# Patient Record
Sex: MALE | Race: WHITE | NOT HISPANIC OR LATINO | Employment: OTHER | URBAN - NONMETROPOLITAN AREA
[De-identification: names, ages, dates, MRNs, and addresses within clinical notes are randomized per-mention and may not be internally consistent; named-entity substitution may affect disease eponyms.]

---

## 2023-03-23 DIAGNOSIS — F17.200 CURRENT SMOKER: ICD-10-CM

## 2023-03-23 RX ORDER — ATENOLOL 25 MG/1
25 TABLET ORAL DAILY
COMMUNITY
End: 2023-10-23 | Stop reason: SDUPTHER

## 2023-03-23 RX ORDER — EZETIMIBE 10 MG/1
10 TABLET ORAL DAILY
COMMUNITY
End: 2023-10-23 | Stop reason: SDUPTHER

## 2023-03-23 RX ORDER — ATORVASTATIN CALCIUM 40 MG/1
40 TABLET, FILM COATED ORAL DAILY
COMMUNITY
End: 2023-10-23 | Stop reason: SDUPTHER

## 2023-03-23 RX ORDER — GABAPENTIN 600 MG/1
600 TABLET ORAL NIGHTLY
COMMUNITY
Start: 2022-07-18 | End: 2023-06-13 | Stop reason: SDUPTHER

## 2023-03-23 RX ORDER — ALBUTEROL SULFATE 90 UG/1
2 AEROSOL, METERED RESPIRATORY (INHALATION) EVERY 6 HOURS
Qty: 54 G | Refills: 3 | Status: SHIPPED | OUTPATIENT
Start: 2023-03-23 | End: 2023-07-20 | Stop reason: SDUPTHER

## 2023-03-23 RX ORDER — CLOPIDOGREL BISULFATE 75 MG/1
75 TABLET ORAL DAILY
COMMUNITY
End: 2023-10-23 | Stop reason: SDUPTHER

## 2023-03-23 RX ORDER — ALBUTEROL SULFATE 90 UG/1
2 AEROSOL, METERED RESPIRATORY (INHALATION) EVERY 6 HOURS
COMMUNITY
Start: 2021-06-16 | End: 2023-03-23 | Stop reason: SDUPTHER

## 2023-05-19 ENCOUNTER — HOSPITAL ENCOUNTER (OUTPATIENT)
Dept: DATA CONVERSION | Facility: HOSPITAL | Age: 78
End: 2023-05-19
Attending: PAIN MEDICINE | Admitting: PAIN MEDICINE

## 2023-05-19 DIAGNOSIS — M25.512 PAIN IN LEFT SHOULDER: ICD-10-CM

## 2023-05-19 DIAGNOSIS — M79.602 PAIN IN LEFT ARM: ICD-10-CM

## 2023-05-19 DIAGNOSIS — M48.02 SPINAL STENOSIS, CERVICAL REGION: ICD-10-CM

## 2023-05-19 DIAGNOSIS — M54.2 CERVICALGIA: ICD-10-CM

## 2023-05-19 DIAGNOSIS — I25.2 OLD MYOCARDIAL INFARCTION: ICD-10-CM

## 2023-05-19 DIAGNOSIS — Z79.02 LONG TERM (CURRENT) USE OF ANTITHROMBOTICS/ANTIPLATELETS: ICD-10-CM

## 2023-05-19 DIAGNOSIS — M50.10 CERVICAL DISC DISORDER WITH RADICULOPATHY, UNSPECIFIED CERVICAL REGION: ICD-10-CM

## 2023-05-19 DIAGNOSIS — I25.10 ATHEROSCLEROTIC HEART DISEASE OF NATIVE CORONARY ARTERY WITHOUT ANGINA PECTORIS: ICD-10-CM

## 2023-05-19 DIAGNOSIS — M25.511 PAIN IN RIGHT SHOULDER: ICD-10-CM

## 2023-05-19 DIAGNOSIS — E78.00 PURE HYPERCHOLESTEROLEMIA, UNSPECIFIED: ICD-10-CM

## 2023-05-19 DIAGNOSIS — Z79.82 LONG TERM (CURRENT) USE OF ASPIRIN: ICD-10-CM

## 2023-05-19 DIAGNOSIS — R52 PAIN, UNSPECIFIED: ICD-10-CM

## 2023-05-19 DIAGNOSIS — F17.200 NICOTINE DEPENDENCE, UNSPECIFIED, UNCOMPLICATED: ICD-10-CM

## 2023-05-19 DIAGNOSIS — M47.22 OTHER SPONDYLOSIS WITH RADICULOPATHY, CERVICAL REGION: ICD-10-CM

## 2023-05-19 DIAGNOSIS — I10 ESSENTIAL (PRIMARY) HYPERTENSION: ICD-10-CM

## 2023-05-19 DIAGNOSIS — Z95.5 PRESENCE OF CORONARY ANGIOPLASTY IMPLANT AND GRAFT: ICD-10-CM

## 2023-05-19 DIAGNOSIS — M79.601 PAIN IN RIGHT ARM: ICD-10-CM

## 2023-06-08 ENCOUNTER — TELEPHONE (OUTPATIENT)
Dept: PRIMARY CARE | Facility: CLINIC | Age: 78
End: 2023-06-08

## 2023-06-08 DIAGNOSIS — M19.90 OSTEOARTHRITIS, UNSPECIFIED OSTEOARTHRITIS TYPE, UNSPECIFIED SITE: Primary | ICD-10-CM

## 2023-06-08 NOTE — TELEPHONE ENCOUNTER
PATIENT C/O BILATERAL SHOULDER PAIN. USING ES TYLENOL, 500 MG EVERY 8 HOURS, WITHOUT RELIEF. WIFE IS APPLYING LIDOCAINE CREAM, WITH MINIMAL RELIEF. CAN HE TAKE GABAPENTIN, 600 MG TID INSTEAD OF ONLY AT BEDTIME?

## 2023-06-13 RX ORDER — GABAPENTIN 600 MG/1
600 TABLET ORAL 2 TIMES DAILY
Qty: 28 TABLET | Refills: 0 | Status: SHIPPED | OUTPATIENT
Start: 2023-06-13 | End: 2023-06-26 | Stop reason: SDUPTHER

## 2023-06-26 DIAGNOSIS — M19.90 OSTEOARTHRITIS, UNSPECIFIED OSTEOARTHRITIS TYPE, UNSPECIFIED SITE: ICD-10-CM

## 2023-06-26 RX ORDER — GABAPENTIN 600 MG/1
600 TABLET ORAL 3 TIMES DAILY
Qty: 90 TABLET | Refills: 1 | Status: SHIPPED | OUTPATIENT
Start: 2023-06-26 | End: 2023-10-09 | Stop reason: SDUPTHER

## 2023-07-13 LAB
ALANINE AMINOTRANSFERASE (SGPT) (U/L) IN SER/PLAS: 28 U/L (ref 10–52)
ALBUMIN (G/DL) IN SER/PLAS: 3.9 G/DL (ref 3.4–5)
ALKALINE PHOSPHATASE (U/L) IN SER/PLAS: 63 U/L (ref 33–136)
ANION GAP IN SER/PLAS: 10 MMOL/L (ref 10–20)
ASPARTATE AMINOTRANSFERASE (SGOT) (U/L) IN SER/PLAS: 25 U/L (ref 9–39)
BILIRUBIN TOTAL (MG/DL) IN SER/PLAS: 0.9 MG/DL (ref 0–1.2)
CALCIUM (MG/DL) IN SER/PLAS: 10.1 MG/DL (ref 8.6–10.3)
CARBON DIOXIDE, TOTAL (MMOL/L) IN SER/PLAS: 34 MMOL/L (ref 21–32)
CHLORIDE (MMOL/L) IN SER/PLAS: 101 MMOL/L (ref 98–107)
CHOLESTEROL (MG/DL) IN SER/PLAS: 138 MG/DL (ref 0–199)
CHOLESTEROL IN HDL (MG/DL) IN SER/PLAS: 44 MG/DL
CHOLESTEROL/HDL RATIO: 3.1
CREATININE (MG/DL) IN SER/PLAS: 1.26 MG/DL (ref 0.5–1.3)
ERYTHROCYTE DISTRIBUTION WIDTH (RATIO) BY AUTOMATED COUNT: 14.7 % (ref 11.5–14.5)
ERYTHROCYTE MEAN CORPUSCULAR HEMOGLOBIN CONCENTRATION (G/DL) BY AUTOMATED: 32.2 G/DL (ref 32–36)
ERYTHROCYTE MEAN CORPUSCULAR VOLUME (FL) BY AUTOMATED COUNT: 97 FL (ref 80–100)
ERYTHROCYTES (10*6/UL) IN BLOOD BY AUTOMATED COUNT: 5.03 X10E12/L (ref 4.5–5.9)
GFR MALE: 58 ML/MIN/1.73M2
GLUCOSE (MG/DL) IN SER/PLAS: 64 MG/DL (ref 74–99)
HEMATOCRIT (%) IN BLOOD BY AUTOMATED COUNT: 49 % (ref 41–52)
HEMOGLOBIN (G/DL) IN BLOOD: 15.8 G/DL (ref 13.5–17.5)
LDL: 73 MG/DL (ref 0–99)
LEUKOCYTES (10*3/UL) IN BLOOD BY AUTOMATED COUNT: 6.7 X10E9/L (ref 4.4–11.3)
PLATELETS (10*3/UL) IN BLOOD AUTOMATED COUNT: 178 X10E9/L (ref 150–450)
POTASSIUM (MMOL/L) IN SER/PLAS: 4 MMOL/L (ref 3.5–5.3)
PROTEIN TOTAL: 7.2 G/DL (ref 6.4–8.2)
SODIUM (MMOL/L) IN SER/PLAS: 141 MMOL/L (ref 136–145)
TRIGLYCERIDE (MG/DL) IN SER/PLAS: 103 MG/DL (ref 0–149)
UREA NITROGEN (MG/DL) IN SER/PLAS: 30 MG/DL (ref 6–23)
VLDL: 21 MG/DL (ref 0–40)

## 2023-07-19 PROBLEM — M19.012 ARTHROPATHY OF LEFT SHOULDER: Status: ACTIVE | Noted: 2023-07-19

## 2023-07-19 PROBLEM — G56.82: Status: ACTIVE | Noted: 2023-07-19

## 2023-07-19 PROBLEM — I10 HYPERTENSION, ESSENTIAL, BENIGN: Status: ACTIVE | Noted: 2023-07-19

## 2023-07-19 PROBLEM — G56.81: Status: ACTIVE | Noted: 2023-07-19

## 2023-07-19 PROBLEM — M54.12 CERVICAL RADICULAR PAIN: Status: ACTIVE | Noted: 2023-07-19

## 2023-07-19 PROBLEM — M19.011 ARTHROPATHY OF RIGHT SHOULDER: Status: ACTIVE | Noted: 2023-07-19

## 2023-07-19 PROBLEM — M25.519 SHOULDER PAIN: Status: ACTIVE | Noted: 2023-07-19

## 2023-07-19 PROBLEM — M25.519 ARTHRALGIA OF SHOULDER: Status: ACTIVE | Noted: 2023-07-19

## 2023-07-19 PROBLEM — F17.200 CURRENT SMOKER: Status: ACTIVE | Noted: 2023-07-19

## 2023-07-19 PROBLEM — M54.2 NECK PAIN: Status: ACTIVE | Noted: 2023-07-19

## 2023-07-19 PROBLEM — N40.1 BENIGN PROSTATIC HYPERPLASIA WITH URINARY OBSTRUCTION AND OTHER LOWER URINARY TRACT SYMPTOMS: Status: ACTIVE | Noted: 2023-07-19

## 2023-07-19 PROBLEM — N20.1 LEFT URETERAL STONE: Status: ACTIVE | Noted: 2023-07-19

## 2023-07-19 PROBLEM — M47.22 OSTEOARTHRITIS OF SPINE WITH RADICULOPATHY, CERVICAL REGION: Status: ACTIVE | Noted: 2023-07-19

## 2023-07-19 PROBLEM — M75.52 BURSITIS OF LEFT SHOULDER: Status: ACTIVE | Noted: 2023-07-19

## 2023-07-19 PROBLEM — N13.8 BENIGN PROSTATIC HYPERPLASIA WITH URINARY OBSTRUCTION AND OTHER LOWER URINARY TRACT SYMPTOMS: Status: ACTIVE | Noted: 2023-07-19

## 2023-07-19 PROBLEM — M48.02 DEGENERATIVE CERVICAL SPINAL STENOSIS: Status: ACTIVE | Noted: 2023-07-19

## 2023-07-19 PROBLEM — M75.51 BURSITIS OF RIGHT SHOULDER: Status: ACTIVE | Noted: 2023-07-19

## 2023-07-19 PROBLEM — S12.690A: Status: ACTIVE | Noted: 2023-07-19

## 2023-07-19 PROBLEM — N18.31 STAGE 3A CHRONIC KIDNEY DISEASE (MULTI): Status: ACTIVE | Noted: 2023-07-19

## 2023-07-19 PROBLEM — N20.0 LEFT RENAL STONE: Status: ACTIVE | Noted: 2023-07-19

## 2023-07-19 PROBLEM — S22.070A COMPRESSION FRACTURE OF T10 VERTEBRA (MULTI): Status: ACTIVE | Noted: 2023-07-19

## 2023-07-19 PROBLEM — R31.9 HEMATURIA: Status: ACTIVE | Noted: 2023-07-19

## 2023-07-19 PROBLEM — M50.30 DEGENERATION OF CERVICAL INTERVERTEBRAL DISC: Status: ACTIVE | Noted: 2023-07-19

## 2023-07-19 PROBLEM — E78.00 HYPERCHOLESTEROLEMIA: Status: ACTIVE | Noted: 2023-07-19

## 2023-07-19 PROBLEM — I25.10 CAD (CORONARY ARTERY DISEASE): Status: ACTIVE | Noted: 2023-07-19

## 2023-07-19 RX ORDER — PREDNISONE 10 MG/1
10 TABLET ORAL DAILY
COMMUNITY
Start: 2023-06-12 | End: 2023-07-20 | Stop reason: ALTCHOICE

## 2023-07-20 ENCOUNTER — OFFICE VISIT (OUTPATIENT)
Dept: PRIMARY CARE | Facility: CLINIC | Age: 78
End: 2023-07-20
Payer: MEDICARE

## 2023-07-20 VITALS
WEIGHT: 164 LBS | HEIGHT: 72 IN | HEART RATE: 64 BPM | OXYGEN SATURATION: 92 % | DIASTOLIC BLOOD PRESSURE: 50 MMHG | SYSTOLIC BLOOD PRESSURE: 90 MMHG | BODY MASS INDEX: 22.21 KG/M2

## 2023-07-20 DIAGNOSIS — M19.011 ARTHROPATHY OF RIGHT SHOULDER: ICD-10-CM

## 2023-07-20 DIAGNOSIS — M19.012 ARTHROPATHY OF LEFT SHOULDER: ICD-10-CM

## 2023-07-20 DIAGNOSIS — I10 HYPERTENSION, ESSENTIAL, BENIGN: ICD-10-CM

## 2023-07-20 DIAGNOSIS — I25.10 CORONARY ARTERY DISEASE INVOLVING NATIVE HEART WITHOUT ANGINA PECTORIS, UNSPECIFIED VESSEL OR LESION TYPE: ICD-10-CM

## 2023-07-20 DIAGNOSIS — F17.200 CURRENT SMOKER: ICD-10-CM

## 2023-07-20 DIAGNOSIS — Z00.00 ROUTINE GENERAL MEDICAL EXAMINATION AT HEALTH CARE FACILITY: Primary | ICD-10-CM

## 2023-07-20 DIAGNOSIS — E78.00 HYPERCHOLESTEROLEMIA: ICD-10-CM

## 2023-07-20 DIAGNOSIS — J43.9 PULMONARY EMPHYSEMA, UNSPECIFIED EMPHYSEMA TYPE (MULTI): ICD-10-CM

## 2023-07-20 PROCEDURE — 1170F FXNL STATUS ASSESSED: CPT | Performed by: FAMILY MEDICINE

## 2023-07-20 PROCEDURE — 3074F SYST BP LT 130 MM HG: CPT | Performed by: FAMILY MEDICINE

## 2023-07-20 PROCEDURE — 99214 OFFICE O/P EST MOD 30 MIN: CPT | Performed by: FAMILY MEDICINE

## 2023-07-20 PROCEDURE — 1160F RVW MEDS BY RX/DR IN RCRD: CPT | Performed by: FAMILY MEDICINE

## 2023-07-20 PROCEDURE — 3078F DIAST BP <80 MM HG: CPT | Performed by: FAMILY MEDICINE

## 2023-07-20 PROCEDURE — 1159F MED LIST DOCD IN RCRD: CPT | Performed by: FAMILY MEDICINE

## 2023-07-20 PROCEDURE — G0439 PPPS, SUBSEQ VISIT: HCPCS | Performed by: FAMILY MEDICINE

## 2023-07-20 RX ORDER — ALBUTEROL SULFATE 90 UG/1
2 AEROSOL, METERED RESPIRATORY (INHALATION) EVERY 6 HOURS PRN
Qty: 54 G | Refills: 3 | Status: SHIPPED | OUTPATIENT
Start: 2023-07-20 | End: 2024-07-19

## 2023-07-20 ASSESSMENT — PATIENT HEALTH QUESTIONNAIRE - PHQ9
SUM OF ALL RESPONSES TO PHQ9 QUESTIONS 1 AND 2: 0
10. IF YOU CHECKED OFF ANY PROBLEMS, HOW DIFFICULT HAVE THESE PROBLEMS MADE IT FOR YOU TO DO YOUR WORK, TAKE CARE OF THINGS AT HOME, OR GET ALONG WITH OTHER PEOPLE: NOT DIFFICULT AT ALL
1. LITTLE INTEREST OR PLEASURE IN DOING THINGS: NOT AT ALL
2. FEELING DOWN, DEPRESSED OR HOPELESS: NOT AT ALL

## 2023-07-20 ASSESSMENT — ACTIVITIES OF DAILY LIVING (ADL)
DRESSING: INDEPENDENT
TAKING_MEDICATION: INDEPENDENT
MANAGING_FINANCES: INDEPENDENT
DOING_HOUSEWORK: INDEPENDENT
BATHING: INDEPENDENT
GROCERY_SHOPPING: INDEPENDENT

## 2023-07-20 ASSESSMENT — ENCOUNTER SYMPTOMS
DEPRESSION: 0
OCCASIONAL FEELINGS OF UNSTEADINESS: 0
LOSS OF SENSATION IN FEET: 0

## 2023-07-20 NOTE — PROGRESS NOTES
Subjective   Reason for Visit: Eusebio Marroquin is an 78 y.o. male here for a Medicare Wellness visit.     Past Medical, Surgical, and Family History reviewed and updated in chart.    Reviewed all medications by prescribing practitioner or clinical pharmacist (such as prescriptions, OTCs, herbal therapies and supplements) and documented in the medical record.    HPI    Patient Care Team:  Kamari Thayer MD as PCP - General  Kamari Thayer MD as PCP - [employer] PCP     Review of Systems    Objective   Vitals:  BP 90/50   Pulse 64   Ht 1.829 m (6')   Wt 74.4 kg (164 lb)   SpO2 92%   BMI 22.24 kg/m²       Physical Exam    Assessment/Plan   Problem List Items Addressed This Visit     Current smoker   Other Visit Diagnoses     Routine general medical examination at health care facility    -  Primary

## 2023-07-20 NOTE — PROGRESS NOTES
Subjective   Patient ID: Eusebio Marroquin is a 78 y.o. male who presents for Medicare Annual Wellness Visit Subsequent (6 MO labs).    HPI   Has reduced to 3 cigs a day as noted dyspneawoth activity  To see roseline for bilat shoulder evaluation  CAD/MI in 1991and no angina  Hyperlipidemia- is on a statin and a prudent diet.  Shoulder arthritis- mri and xrays reviewed showing severe glenohumeral disease and a variety of rotator cuff disease  COPD is no PFTs he is not on a controller simply as needed albuterol.  RBA reviewed  Review of Systems  Denies chest pains palpitations.  He does note a change in his exercise tolerance and capacity with respiratory limitation.  This is what is helped him to reduce cigarettes  Chest x-ray shows emphysematous changes no PFT on file.  GI no abdominal pain reflux  Objective   BP 90/50   Pulse 64   Ht 1.829 m (6')   Wt 74.4 kg (164 lb)   SpO2 92%   BMI 22.24 kg/m²     Physical Exam  General:  Alert, No acute distress. Appears stated age  Eye:  Pupils are equal, round and reactive to light, Extraocular movements are intact, Normal conjunctiva.    Neck:  Supple, Non-tender, No carotid bruit, No jugular venous distention, No lymphadenopathy, No thyromegaly.    Respiratory:  Lungs are c some wheeze and rhonchi to auscultation, Respirations are non-labored, Breath sounds are equal.    Cardiovascular:  Normal rate, Regular rhythm, No murmur.    Gastrointestinal:  Soft, Non-tender, No organomegaly. No solid or pulsatile mass  Integumentary:  Warm, Dry. No concerning lesions on exposed areas  Neurologic:  Alert, Oriented.  Gross and fine motor intact, CN 2-12 intact  Psychiatric:  Cooperative, Appropriate mood & affect.  Assessment/Plan   Problem List Items Addressed This Visit       Arthropathy of left shoulder    Relevant Orders    Follow Up In Primary Care - Established    Arthropathy of right shoulder    Relevant Orders    Follow Up In Primary Care - Established    CAD (coronary  artery disease)    Relevant Orders    Follow Up In Primary Care - Established    Current smoker    Relevant Medications    albuterol 90 mcg/actuation inhaler    Other Relevant Orders    Follow Up In Primary Care - Established    Hypercholesterolemia    Relevant Orders    CBC    Comprehensive Metabolic Panel    Lipid Panel    Follow Up In Primary Care - Established    Hypertension, essential, benign    Relevant Orders    Follow Up In Primary Care - Established     Other Visit Diagnoses       Routine general medical examination at health care facility    -  Primary    Relevant Orders    Follow Up In Primary Care - Established    Pulmonary emphysema, unspecified emphysema type (CMS/HCC)        Relevant Medications    albuterol 90 mcg/actuation inhaler

## 2023-09-07 VITALS — BODY MASS INDEX: 23.05 KG/M2 | HEIGHT: 72 IN | WEIGHT: 170.19 LBS

## 2023-10-02 NOTE — OP NOTE
PROCEDURE DETAILS    Preoperative Diagnosis:  Radiculopathy of cervical region, M54.12    Postoperative Diagnosis:  Radiculopathy of cervical region, M54.12    Surgeon: Gerald Jc  Resident/Fellow/Other Assistant: None of these were associated with this case    Procedure:  1. C7-T1 BILLY    Anesthesia: No anesthesiologist associated with this case  Estimated Blood Loss: 0  Findings: NA  Additional Details: The patient has a greater than 2-month history of severe neck and arm pain.  The patient has previously had 6 weeks of conservative management with exercise therapy and  medications.  The patient has been compliant with a home exercise program for this issue.  The pain significantly interrupts the patient's physical function.  The patient does not desire spine surgery.  The patient had undergone a previous epidural injection  and obtained greater than 60% pain relief and functional improvement for at least 3 months.  His imaging is notable for spinal stenosis at C3-C4.  He has difficulty performing ADLs such as feeding and grooming himself due to the pain.        Operative Report:   Procedure: Interlaminar cervical epidural steroid injection under fluoroscopic guidance at the C7-T1 interspace  Diagnosis: Cervical radiculopathy  Solution: 1 mL of Kenalog 40 mg, 0.5 mL of lidocaine 2%, 2.5 mL normal saline, 4 mL total volume  Total contrast: 1 mL Omnipaque  Anesthesia: Local  Complications: None    After informed consent was obtained, the patient was brought to the OR and placed in the prone position. The area in question was prepped and draped  in sterile fashion. An AP fluoroscopic view of the cervical spine was obtained and after 3 mL of lidocaine 1% was injected into the skin, a 17-gauge Touhy needle was inserted into the skin and advanced toward the posterior lamina of the C7 vertebrae under  intermittent fluoroscopic guidance. The needle was then walked off superiorly into the C7-T1 interspace. The  epidural space was identified via loss of resistance to air. Proper needle position was confirmed by AP and contralateral oblique fluoroscopic  views. Contrast was administered under live fluoroscopy in both views and demonstrated appropriate epidural uptake and the absence of any intravascular or intrathecal spread. The local anesthetic steroid solution was then injected incrementally. The needle  was removed. Bleeding was minimal. The patient tolerated the procedure well and was transferred to the recovery room in good condition.                        Attestation:   Note Completion:  Attending Attestation I performed the procedure without a resident         Electronic Signatures:  Gerald Jc)  (Signed 19-May-2023 20:53)   Authored: Post-Operative Note, Chart Review, Note Completion      Last Updated: 19-May-2023 20:53 by Gerald Jc)

## 2023-10-09 DIAGNOSIS — M19.90 OSTEOARTHRITIS, UNSPECIFIED OSTEOARTHRITIS TYPE, UNSPECIFIED SITE: ICD-10-CM

## 2023-10-09 RX ORDER — GABAPENTIN 600 MG/1
600 TABLET ORAL 3 TIMES DAILY
Qty: 90 TABLET | Refills: 1 | Status: SHIPPED | OUTPATIENT
Start: 2023-10-09 | End: 2023-11-29 | Stop reason: SDUPTHER

## 2023-10-23 DIAGNOSIS — I25.10 CORONARY ARTERY DISEASE INVOLVING NATIVE HEART WITHOUT ANGINA PECTORIS, UNSPECIFIED VESSEL OR LESION TYPE: Primary | ICD-10-CM

## 2023-10-23 RX ORDER — EZETIMIBE 10 MG/1
10 TABLET ORAL DAILY
Qty: 90 TABLET | Refills: 3 | Status: SHIPPED | OUTPATIENT
Start: 2023-10-23 | End: 2024-10-22

## 2023-10-23 RX ORDER — ATENOLOL 25 MG/1
25 TABLET ORAL DAILY
Qty: 90 TABLET | Refills: 3 | Status: SHIPPED | OUTPATIENT
Start: 2023-10-23 | End: 2024-10-22

## 2023-10-23 RX ORDER — CLOPIDOGREL BISULFATE 75 MG/1
75 TABLET ORAL DAILY
Qty: 90 TABLET | Refills: 3 | Status: SHIPPED | OUTPATIENT
Start: 2023-10-23 | End: 2024-10-22

## 2023-10-23 RX ORDER — ATORVASTATIN CALCIUM 40 MG/1
40 TABLET, FILM COATED ORAL DAILY
Qty: 90 TABLET | Refills: 3 | Status: SHIPPED | OUTPATIENT
Start: 2023-10-23 | End: 2024-10-22

## 2023-11-29 DIAGNOSIS — M19.90 OSTEOARTHRITIS, UNSPECIFIED OSTEOARTHRITIS TYPE, UNSPECIFIED SITE: ICD-10-CM

## 2023-11-29 RX ORDER — GABAPENTIN 600 MG/1
600 TABLET ORAL 3 TIMES DAILY
Qty: 90 TABLET | Refills: 1 | Status: SHIPPED | OUTPATIENT
Start: 2023-11-29 | End: 2024-04-22 | Stop reason: SDUPTHER

## 2023-12-20 ENCOUNTER — PATIENT OUTREACH (OUTPATIENT)
Dept: CARE COORDINATION | Facility: CLINIC | Age: 78
End: 2023-12-20
Payer: MEDICARE

## 2023-12-20 DIAGNOSIS — R55 SYNCOPE AND COLLAPSE: ICD-10-CM

## 2023-12-20 DIAGNOSIS — U07.1 COVID-19: ICD-10-CM

## 2023-12-20 RX ORDER — AMOXICILLIN AND CLAVULANATE POTASSIUM 500; 125 MG/1; MG/1
1 TABLET, FILM COATED ORAL 2 TIMES DAILY
COMMUNITY
Start: 2023-12-19 | End: 2023-12-24

## 2023-12-20 NOTE — PROGRESS NOTES
Discharge Facility:[facility]  Discharge Diagnosis:R55 Syncope and collapse, U07.1 Covid-19  Admission Date:12/15/23  Discharge Date:12/19/23    PCP Appointment Date:12/27/23  Specialist Appointment Date:N/A  Hospital Encounter and Summary: Linked   See discharge assessment below for further details    Medications  Medications reviewed with patient/caregiver?: Yes (12/20/2023 10:26 AM)  Is the patient having any side effects they believe may be caused by any medication additions or changes?: No (12/20/2023 10:26 AM)  Does the patient have all medications ordered at discharge?: Yes (12/20/2023 10:26 AM)  Care Management Interventions: Provided patient education (12/20/2023 10:26 AM)  Is the patient taking all medications as directed (includes completed medication regime)?: Yes (12/20/2023 10:26 AM)  Care Management Interventions: Provided patient education (12/20/2023 10:26 AM)  Medication Comments: Augmentin (12/20/2023 10:26 AM)    Appointments  Does the patient have a primary care provider?: Yes (12/20/2023 10:26 AM)  Care Management Interventions: Verified appointment date/time/provider (12/20/2023 10:26 AM)  Has the patient kept scheduled appointments due by today?: Yes (12/20/2023 10:26 AM)  Care Management Interventions: Advised patient to keep appointment; Educated on importance of keeping appointment (12/20/2023 10:26 AM)    Self Management  Has home health visited the patient within 72 hours of discharge?: Not applicable (12/20/2023 10:26 AM)    Patient Teaching  Does the patient have access to their discharge instructions?: Yes (12/20/2023 10:26 AM)  Care Management Interventions: Reviewed instructions with patient (12/20/2023 10:26 AM)  What is the patient's perception of their health status since discharge?: Improving (12/20/2023 10:26 AM)  Is the patient/caregiver able to teach back the hierarchy of who to call/visit for symptoms/problems? PCP, Specialist, Home Health nurse, Urgent Care, ED,  911: Yes (12/20/2023 10:26 AM)

## 2023-12-27 ENCOUNTER — OFFICE VISIT (OUTPATIENT)
Dept: PRIMARY CARE | Facility: CLINIC | Age: 78
End: 2023-12-27
Payer: MEDICARE

## 2023-12-27 VITALS
HEIGHT: 72 IN | OXYGEN SATURATION: 92 % | SYSTOLIC BLOOD PRESSURE: 102 MMHG | WEIGHT: 163.9 LBS | BODY MASS INDEX: 22.2 KG/M2 | HEART RATE: 64 BPM | DIASTOLIC BLOOD PRESSURE: 54 MMHG

## 2023-12-27 DIAGNOSIS — U07.1 COVID-19: ICD-10-CM

## 2023-12-27 DIAGNOSIS — J43.9 PULMONARY EMPHYSEMA, UNSPECIFIED EMPHYSEMA TYPE (MULTI): Primary | ICD-10-CM

## 2023-12-27 PROCEDURE — 1160F RVW MEDS BY RX/DR IN RCRD: CPT | Performed by: STUDENT IN AN ORGANIZED HEALTH CARE EDUCATION/TRAINING PROGRAM

## 2023-12-27 PROCEDURE — 1159F MED LIST DOCD IN RCRD: CPT | Performed by: STUDENT IN AN ORGANIZED HEALTH CARE EDUCATION/TRAINING PROGRAM

## 2023-12-27 PROCEDURE — 3078F DIAST BP <80 MM HG: CPT | Performed by: STUDENT IN AN ORGANIZED HEALTH CARE EDUCATION/TRAINING PROGRAM

## 2023-12-27 PROCEDURE — 3074F SYST BP LT 130 MM HG: CPT | Performed by: STUDENT IN AN ORGANIZED HEALTH CARE EDUCATION/TRAINING PROGRAM

## 2023-12-27 PROCEDURE — 99495 TRANSJ CARE MGMT MOD F2F 14D: CPT | Performed by: STUDENT IN AN ORGANIZED HEALTH CARE EDUCATION/TRAINING PROGRAM

## 2023-12-27 RX ORDER — CHOLECALCIFEROL (VITAMIN D3) 25 MCG
1000 TABLET ORAL
COMMUNITY

## 2023-12-27 RX ORDER — PHENYLEPHRINE HCL 10 MG
500 TABLET ORAL
COMMUNITY

## 2023-12-27 RX ORDER — PNV NO.95/FERROUS FUM/FOLIC AC 28MG-0.8MG
1000 TABLET ORAL
COMMUNITY

## 2023-12-27 RX ORDER — GLUCOSAMINE/CHONDRO SU A 500-400 MG
1 TABLET ORAL 3 TIMES DAILY
COMMUNITY

## 2023-12-27 NOTE — PROGRESS NOTES
Subjective:  Eusebio Marroquin is a 78 y.o. male who presents to clinic today for Hospital Follow-up (12/15  Covid and pnuemonia )      Hospitalized at [facility] 12/15/23 with COVID 19 and pneumonia  - fell in the driveway 3 days before that   - he was admitted for 4 days  - he completed his augmentin after hospitalization     He went back to the ER on 12/20/23, diagnosed with dehydration and was given IV fluids    He is having difficulty with sleep     Having significant right shoulder pain  - had surgery on it September 5th    Review of Systems    Assessment/Plan:  uEsebio Marroquin is a 78 y.o. male with a history of COPD, HLT, HTN, CAD  who presents to clinic today to address the following issues:   1. Pulmonary emphysema, unspecified emphysema type (CMS/HCC)  mometasone-formoterol (Dulera 50) 50-5 mcg/actuation HFA aerosol inhaler inhaler      2. COVID-19          - Chronic problem, unresolved, new to this provider, requires further workup and treatment   - Discussed with pt that his COPD is not currently being managed with a controller inhaler, he is in agreement to start an inhaler although he may have some at home. He will check what he has at home and plan to bring all of the inhalers that he has at home to his follow up appointment    Recent Hospitalization:  -patient was able to complete medications as prescribed  - he is feeling better an feels that he is managing well at home    Problem List Items Addressed This Visit    None  Visit Diagnoses       Pulmonary emphysema, unspecified emphysema type (CMS/HCC)    -  Primary    Relevant Medications    mometasone-formoterol (Dulera 50) 50-5 mcg/actuation HFA aerosol inhaler inhaler    COVID-19                There are no Patient Instructions on file for this visit.    Follow up: with primary care doctor January 12,2023    Return precautions discussed.  An After Visit Summary was given to the patient.  All questions were answered and patient  in agreement with plan.    Objective:  /54   Pulse 64   Ht 1.829 m (6')   Wt 74.3 kg (163 lb 14.4 oz)   SpO2 92%   BMI 22.23 kg/m²     Physical Exam  Vitals and nursing note reviewed.   Constitutional:       General: He is not in acute distress.     Appearance: He is not ill-appearing.   HENT:      Head: Normocephalic and atraumatic.      Mouth/Throat:      Mouth: Mucous membranes are moist.   Eyes:      General: No scleral icterus.        Right eye: No discharge.         Left eye: No discharge.      Extraocular Movements: Extraocular movements intact.      Conjunctiva/sclera: Conjunctivae normal.   Cardiovascular:      Rate and Rhythm: Normal rate and regular rhythm.   Pulmonary:      Effort: No respiratory distress.      Breath sounds: Wheezing present.   Skin:     General: Skin is dry.   Neurological:      General: No focal deficit present.      Mental Status: He is alert and oriented to person, place, and time.   Psychiatric:         Thought Content: Thought content normal.         Judgment: Judgment normal.         I spent 22 minutes in total time for this visit including all related clinical activities before, during, and after the visit excluding other billable activities/procedure time.     Kylee Trevizo MD

## 2023-12-29 ENCOUNTER — PATIENT OUTREACH (OUTPATIENT)
Dept: CARE COORDINATION | Facility: CLINIC | Age: 78
End: 2023-12-29
Payer: MEDICARE

## 2023-12-29 NOTE — PROGRESS NOTES
Call regarding appt. with PCP on 12/27/23 after hospitalization.  At time of outreach call the patient feels as if their condition has improved since last visit.  Reviewed the PCP appointment with the pt and addressed any questions or concerns.

## 2024-01-12 ENCOUNTER — OFFICE VISIT (OUTPATIENT)
Dept: PRIMARY CARE | Facility: CLINIC | Age: 79
End: 2024-01-12
Payer: MEDICARE

## 2024-01-12 VITALS
SYSTOLIC BLOOD PRESSURE: 102 MMHG | HEIGHT: 72 IN | WEIGHT: 164.7 LBS | BODY MASS INDEX: 22.31 KG/M2 | DIASTOLIC BLOOD PRESSURE: 66 MMHG | HEART RATE: 68 BPM | OXYGEN SATURATION: 92 %

## 2024-01-12 DIAGNOSIS — J43.9 PULMONARY EMPHYSEMA, UNSPECIFIED EMPHYSEMA TYPE (MULTI): Primary | ICD-10-CM

## 2024-01-12 DIAGNOSIS — N18.31 STAGE 3A CHRONIC KIDNEY DISEASE (MULTI): ICD-10-CM

## 2024-01-12 DIAGNOSIS — I73.9 PERIPHERAL VASCULAR DISEASE, UNSPECIFIED (CMS-HCC): ICD-10-CM

## 2024-01-12 PROCEDURE — 99214 OFFICE O/P EST MOD 30 MIN: CPT | Performed by: FAMILY MEDICINE

## 2024-01-12 PROCEDURE — 1159F MED LIST DOCD IN RCRD: CPT | Performed by: FAMILY MEDICINE

## 2024-01-12 PROCEDURE — 3074F SYST BP LT 130 MM HG: CPT | Performed by: FAMILY MEDICINE

## 2024-01-12 PROCEDURE — 3078F DIAST BP <80 MM HG: CPT | Performed by: FAMILY MEDICINE

## 2024-02-01 ENCOUNTER — PATIENT OUTREACH (OUTPATIENT)
Dept: CARE COORDINATION | Facility: CLINIC | Age: 79
End: 2024-02-01
Payer: MEDICARE

## 2024-03-01 ENCOUNTER — PATIENT OUTREACH (OUTPATIENT)
Dept: CARE COORDINATION | Facility: CLINIC | Age: 79
End: 2024-03-01
Payer: MEDICARE

## 2024-03-01 NOTE — PROGRESS NOTES
Call placed regarding 90 day post discharge follow up call.  At time of outreach call the patient feels as if their condition has improved since initial visit with PCP or specialist. No questions or concerns.

## 2024-04-22 DIAGNOSIS — M19.90 OSTEOARTHRITIS, UNSPECIFIED OSTEOARTHRITIS TYPE, UNSPECIFIED SITE: ICD-10-CM

## 2024-04-22 RX ORDER — GABAPENTIN 600 MG/1
600 TABLET ORAL 3 TIMES DAILY
Qty: 90 TABLET | Refills: 1 | Status: SHIPPED | OUTPATIENT
Start: 2024-04-22 | End: 2024-06-21

## 2024-04-26 ENCOUNTER — OFFICE VISIT (OUTPATIENT)
Dept: PRIMARY CARE | Facility: CLINIC | Age: 79
End: 2024-04-26
Payer: MEDICARE

## 2024-04-26 VITALS
OXYGEN SATURATION: 97 % | HEIGHT: 72 IN | HEART RATE: 67 BPM | WEIGHT: 177.6 LBS | DIASTOLIC BLOOD PRESSURE: 72 MMHG | SYSTOLIC BLOOD PRESSURE: 122 MMHG | BODY MASS INDEX: 24.06 KG/M2

## 2024-04-26 DIAGNOSIS — S20.211A CONTUSION OF RIGHT CHEST WALL, INITIAL ENCOUNTER: Primary | ICD-10-CM

## 2024-04-26 PROCEDURE — 99213 OFFICE O/P EST LOW 20 MIN: CPT | Performed by: FAMILY MEDICINE

## 2024-04-26 PROCEDURE — 3074F SYST BP LT 130 MM HG: CPT | Performed by: FAMILY MEDICINE

## 2024-04-26 PROCEDURE — 1160F RVW MEDS BY RX/DR IN RCRD: CPT | Performed by: FAMILY MEDICINE

## 2024-04-26 PROCEDURE — 1159F MED LIST DOCD IN RCRD: CPT | Performed by: FAMILY MEDICINE

## 2024-04-26 PROCEDURE — 3078F DIAST BP <80 MM HG: CPT | Performed by: FAMILY MEDICINE

## 2024-04-26 NOTE — PROGRESS NOTES
Subjective   Patient ID: Esuebio Marroquin is a 78 y.o. male who presents for ER FU FALL.    HPI fell on steps  Had right tsr, in lst yr  Declines PT for HEP to aid in balance  Smoking cessation advised currently at half pack per day  Review of Systems  General-no fatigue weight to within 10 pounds  ENT no problems with vision swallowing  Cardiac no chest pains palpitations change in exercise tolerance or capacity  Pulmonary no cough shortness of breath  GI no heartburn or abdominal pain  Musculoskeletal no joint pains  Objective   /72   Pulse 67   Ht 1.829 m (6')   Wt 80.6 kg (177 lb 9.6 oz)   SpO2 97%   BMI 24.09 kg/m²     Physical Exam  General:  Alert, No acute distress. Appears stated age  Eye:  Pupils are equal, round and reactive to light, Extraocular movements are intact, Normal conjunctiva.    Neck:  Supple, Non-tender, No carotid bruit, No jugular venous distention, No lymphadenopathy, No thyromegaly.    Respiratory:  Lungs are clear to auscultation, Respirations are non-labored, Breath sounds are equal.  Tender on right mid thorax in anterior clavicular line  Cardiovascular:  Normal rate, Regular rhythm, No murmur.    Gastrointestinal:  Soft, Non-tender, No organomegaly. No solid or pulsatile mass  Integumentary:  Warm, Dry. No concerning lesions on exposed areas  Neurologic:  Alert, Oriented.  Gross and fine motor intact, CN 2-12 intact  Psychiatric:  Cooperative, Appropriate mood & affect.  Assessment/Plan   Problem List Items Addressed This Visit    None  Visit Diagnoses         Codes    Contusion of right chest wall, initial encounter    -  Primary S20.033Q

## 2024-07-22 ENCOUNTER — APPOINTMENT (OUTPATIENT)
Dept: PRIMARY CARE | Facility: CLINIC | Age: 79
End: 2024-07-22
Payer: MEDICARE

## 2024-07-22 ENCOUNTER — LAB (OUTPATIENT)
Dept: LAB | Facility: LAB | Age: 79
End: 2024-07-22
Payer: MEDICARE

## 2024-07-22 VITALS
SYSTOLIC BLOOD PRESSURE: 100 MMHG | HEIGHT: 72 IN | OXYGEN SATURATION: 88 % | HEART RATE: 65 BPM | DIASTOLIC BLOOD PRESSURE: 60 MMHG | BODY MASS INDEX: 23.8 KG/M2 | WEIGHT: 175.7 LBS

## 2024-07-22 DIAGNOSIS — I25.10 CORONARY ARTERY DISEASE INVOLVING NATIVE HEART WITHOUT ANGINA PECTORIS, UNSPECIFIED VESSEL OR LESION TYPE: ICD-10-CM

## 2024-07-22 DIAGNOSIS — Z00.00 ROUTINE GENERAL MEDICAL EXAMINATION AT HEALTH CARE FACILITY: Primary | ICD-10-CM

## 2024-07-22 DIAGNOSIS — I10 HYPERTENSION, ESSENTIAL, BENIGN: ICD-10-CM

## 2024-07-22 DIAGNOSIS — E78.00 HYPERCHOLESTEROLEMIA: ICD-10-CM

## 2024-07-22 DIAGNOSIS — M19.012 ARTHROPATHY OF LEFT SHOULDER: ICD-10-CM

## 2024-07-22 DIAGNOSIS — M19.011 ARTHROPATHY OF RIGHT SHOULDER: ICD-10-CM

## 2024-07-22 DIAGNOSIS — F17.200 CURRENT SMOKER: ICD-10-CM

## 2024-07-22 LAB
ALBUMIN SERPL BCP-MCNC: 4.2 G/DL (ref 3.4–5)
ALP SERPL-CCNC: 66 U/L (ref 33–136)
ALT SERPL W P-5'-P-CCNC: 20 U/L (ref 10–52)
ANION GAP SERPL CALC-SCNC: 10 MMOL/L (ref 10–20)
AST SERPL W P-5'-P-CCNC: 25 U/L (ref 9–39)
BILIRUB SERPL-MCNC: 1 MG/DL (ref 0–1.2)
BUN SERPL-MCNC: 21 MG/DL (ref 6–23)
CALCIUM SERPL-MCNC: 9.6 MG/DL (ref 8.6–10.3)
CHLORIDE SERPL-SCNC: 104 MMOL/L (ref 98–107)
CHOLEST SERPL-MCNC: 150 MG/DL (ref 0–199)
CHOLESTEROL/HDL RATIO: 3.4
CO2 SERPL-SCNC: 32 MMOL/L (ref 21–32)
CREAT SERPL-MCNC: 1.17 MG/DL (ref 0.5–1.3)
EGFRCR SERPLBLD CKD-EPI 2021: 63 ML/MIN/1.73M*2
ERYTHROCYTE [DISTWIDTH] IN BLOOD BY AUTOMATED COUNT: 14.3 % (ref 11.5–14.5)
GLUCOSE SERPL-MCNC: 77 MG/DL (ref 74–99)
HCT VFR BLD AUTO: 50.7 % (ref 41–52)
HDLC SERPL-MCNC: 44 MG/DL
HGB BLD-MCNC: 15.9 G/DL (ref 13.5–17.5)
LDLC SERPL CALC-MCNC: 82 MG/DL
MCH RBC QN AUTO: 29.9 PG (ref 26–34)
MCHC RBC AUTO-ENTMCNC: 31.4 G/DL (ref 32–36)
MCV RBC AUTO: 96 FL (ref 80–100)
NON HDL CHOLESTEROL: 106 MG/DL (ref 0–149)
NRBC BLD-RTO: 0 /100 WBCS (ref 0–0)
PLATELET # BLD AUTO: 186 X10*3/UL (ref 150–450)
POTASSIUM SERPL-SCNC: 4.3 MMOL/L (ref 3.5–5.3)
PROT SERPL-MCNC: 7.2 G/DL (ref 6.4–8.2)
RBC # BLD AUTO: 5.31 X10*6/UL (ref 4.5–5.9)
SODIUM SERPL-SCNC: 142 MMOL/L (ref 136–145)
TRIGL SERPL-MCNC: 119 MG/DL (ref 0–149)
VLDL: 24 MG/DL (ref 0–40)
WBC # BLD AUTO: 8.2 X10*3/UL (ref 4.4–11.3)

## 2024-07-22 PROCEDURE — 36415 COLL VENOUS BLD VENIPUNCTURE: CPT

## 2024-07-22 PROCEDURE — 80061 LIPID PANEL: CPT

## 2024-07-22 PROCEDURE — 80053 COMPREHEN METABOLIC PANEL: CPT

## 2024-07-22 PROCEDURE — 85027 COMPLETE CBC AUTOMATED: CPT

## 2024-07-22 PROCEDURE — 3074F SYST BP LT 130 MM HG: CPT | Performed by: FAMILY MEDICINE

## 2024-07-22 PROCEDURE — 1124F ACP DISCUSS-NO DSCNMKR DOCD: CPT | Performed by: FAMILY MEDICINE

## 2024-07-22 PROCEDURE — G0439 PPPS, SUBSEQ VISIT: HCPCS | Performed by: FAMILY MEDICINE

## 2024-07-22 PROCEDURE — 3078F DIAST BP <80 MM HG: CPT | Performed by: FAMILY MEDICINE

## 2024-07-22 PROCEDURE — 1170F FXNL STATUS ASSESSED: CPT | Performed by: FAMILY MEDICINE

## 2024-07-22 PROCEDURE — 1160F RVW MEDS BY RX/DR IN RCRD: CPT | Performed by: FAMILY MEDICINE

## 2024-07-22 PROCEDURE — 1159F MED LIST DOCD IN RCRD: CPT | Performed by: FAMILY MEDICINE

## 2024-07-22 PROCEDURE — 99214 OFFICE O/P EST MOD 30 MIN: CPT | Performed by: FAMILY MEDICINE

## 2024-07-22 ASSESSMENT — ENCOUNTER SYMPTOMS
DEPRESSION: 0
OCCASIONAL FEELINGS OF UNSTEADINESS: 0
LOSS OF SENSATION IN FEET: 0

## 2024-07-22 ASSESSMENT — ACTIVITIES OF DAILY LIVING (ADL)
DOING_HOUSEWORK: INDEPENDENT
GROCERY_SHOPPING: INDEPENDENT
BATHING: INDEPENDENT
MANAGING_FINANCES: INDEPENDENT
TAKING_MEDICATION: INDEPENDENT
DRESSING: INDEPENDENT

## 2024-07-22 NOTE — PROGRESS NOTES
Subjective   Reason for Visit: Eusebio Marroquin is an 79 y.o. male here for a Medicare Wellness visit.     Past Medical, Surgical, and Family History reviewed and updated in chart.    Reviewed all medications by prescribing practitioner or clinical pharmacist (such as prescriptions, OTCs, herbal therapies and supplements) and documented in the medical record.    HPI  In ER in April for fall  Since the last office visit there have been no interval operations, hospitalizations, important illnesses or injuries.  Half ppd, discussed/declines quit  Alcohol  none 1987   No drugs  \exercise none.  CAD no angina, no ntg use  Hyperlipidemia- is on a statin and a prudent diet.  HTN-Takes and tolerates meds without side effects. No alcohol. no tobacco. no exercise. low salt.  Reviewed recommendation for 150 minutes of exercise per week including 2 days of weight training if over age 50  Bilateral shoulder pain,  has right tsr with little effort in rehab or exercise  copd- no exacerbations since last ov.  BPH nocturia x1. Stream - has urgency  Patient Care Team:  Kamari Thayer MD as PCP - General     Review of Systems  General-no fatigue weight to within 10 pounds  ENT no problems with vision swallowing  Cardiac no chest pains palpitations change in exercise tolerance or capacity  Pulmonary no cough shortness of breath  GI no heartburn or abdominal pain  Musculoskelet multiple joint pains  Objective   Vitals:  /60   Pulse 65   Ht 1.829 m (6')   Wt 79.7 kg (175 lb 11.2 oz)   SpO2 (!) 88%   BMI 23.83 kg/m²       Physical Exam  General:  Alert, No acute distress. Appears stated age  Eye:  Pupils are equal, round and reactive to light, Extraocular movements are intact, Normal conjunctiva.    Neck:  Supple, Non-tender, No carotid bruit, No jugular venous distention, No lymphadenopathy, No thyromegaly.    Respiratory:  Lungs are clear to auscultation, Respirations are non-labored, Breath sounds are equal.     Cardiovascular:  Normal rate, Regular rhythm, No murmur.    Gastrointestinal:  Soft, Non-tender, No organomegaly. No solid or pulsatile mass  Integumentary:  Warm, Dry. No concerning lesions on exposed areas  Neurologic:  Alert, Oriented.  Gross and fine motor intact, CN 2-12 intact  Psychiatric:  Cooperative, Appropriate mood & affect.  Assessment/Plan   Problem List Items Addressed This Visit             ICD-10-CM    Arthropathy of left shoulder M19.012    Relevant Orders    Follow Up In Primary Care - Established    Arthropathy of right shoulder M19.011    Relevant Orders    Follow Up In Primary Care - Established    CAD (coronary artery disease) I25.10    Relevant Orders    Follow Up In Primary Care - Established    CBC (Completed)    Comprehensive Metabolic Panel    Lipid Panel    Current smoker F17.200    Relevant Orders    Follow Up In Primary Care - Established    Hypercholesterolemia E78.00    Relevant Orders    Follow Up In Primary Care - Established    CBC (Completed)    Comprehensive Metabolic Panel    Lipid Panel    Hypertension, essential, benign I10    Relevant Orders    Follow Up In Primary Care - Established    CBC (Completed)    Comprehensive Metabolic Panel     Other Visit Diagnoses         Codes    Routine general medical examination at health care facility    -  Primary Z00.00    Relevant Orders    Follow Up In Primary Care - Established

## 2024-08-16 ENCOUNTER — OFFICE VISIT (OUTPATIENT)
Age: 79
End: 2024-08-16
Payer: MEDICARE

## 2024-08-16 VITALS
WEIGHT: 180.2 LBS | OXYGEN SATURATION: 94 % | HEIGHT: 72 IN | BODY MASS INDEX: 24.41 KG/M2 | HEART RATE: 61 BPM | SYSTOLIC BLOOD PRESSURE: 112 MMHG | DIASTOLIC BLOOD PRESSURE: 72 MMHG

## 2024-08-16 DIAGNOSIS — J43.9 PULMONARY EMPHYSEMA, UNSPECIFIED EMPHYSEMA TYPE (MULTI): Primary | ICD-10-CM

## 2024-08-16 RX ORDER — PREDNISONE 10 MG/1
TABLET ORAL
Qty: 30 TABLET | Refills: 0 | Status: SHIPPED | OUTPATIENT
Start: 2024-08-16 | End: 2024-08-28

## 2024-08-16 ASSESSMENT — ENCOUNTER SYMPTOMS
SHORTNESS OF BREATH: 0
BLOOD IN STOOL: 0
ABDOMINAL PAIN: 0
DIARRHEA: 0
COUGH: 1
CONSTIPATION: 0
WHEEZING: 1

## 2024-08-16 ASSESSMENT — PATIENT HEALTH QUESTIONNAIRE - PHQ9
1. LITTLE INTEREST OR PLEASURE IN DOING THINGS: NOT AT ALL
2. FEELING DOWN, DEPRESSED OR HOPELESS: NOT AT ALL
SUM OF ALL RESPONSES TO PHQ9 QUESTIONS 1 AND 2: 0

## 2024-08-16 NOTE — PROGRESS NOTES
Subjective   Patient ID: Eusebio Marroquin is a 79 y.o. male who presents for Hemorrhoids (Complaints of hemorrhoids; noticed about 3 weeks ago; has been using Prep H with no relief. ).    HPI   Internal hemorrhoids and colonoscopy 2015.    Does have an internal hemorrhoid that is a problem.  No bleeding but there is some discomfort.  He does take Plavix and aspirin.    Also his lungs sound like they are acting up congestion and rhonchi.    Ahead and do a prednisone taper for the lungs and the hemorrhoid.    If he still having troubles the MRI next week he will call and we will do a hydrocortisone suppository    Review of Systems   Respiratory:  Positive for cough and wheezing. Negative for shortness of breath.    Gastrointestinal:  Negative for abdominal pain, blood in stool, constipation and diarrhea.       Objective   /72 (BP Location: Left arm, Patient Position: Sitting)   Pulse 61   Ht 1.829 m (6')   Wt 81.7 kg (180 lb 3.2 oz)   SpO2 94%   BMI 24.44 kg/m²     Physical Exam  Constitutional:       Appearance: Normal appearance.   Skin:     General: Skin is warm and dry.   Neurological:      General: No focal deficit present.      Mental Status: He is alert and oriented to person, place, and time.   Psychiatric:         Mood and Affect: Mood normal.         Behavior: Behavior normal.         Judgment: Judgment normal.         Assessment/Plan   Problem List Items Addressed This Visit             ICD-10-CM    Pulmonary emphysema, unspecified emphysema type (Multi) - Primary J43.9    Relevant Medications    predniSONE (Deltasone) 10 mg tablet

## 2024-08-26 ENCOUNTER — TELEPHONE (OUTPATIENT)
Age: 79
End: 2024-08-26
Payer: MEDICARE

## 2024-08-26 DIAGNOSIS — J43.9 PULMONARY EMPHYSEMA, UNSPECIFIED EMPHYSEMA TYPE (MULTI): ICD-10-CM

## 2024-08-26 DIAGNOSIS — K64.4 EXTERNAL HEMORRHOID: Primary | ICD-10-CM

## 2024-08-26 RX ORDER — HYDROCORTISONE 25 MG/G
CREAM TOPICAL 4 TIMES DAILY PRN
Qty: 30 G | Refills: 0 | Status: SHIPPED | OUTPATIENT
Start: 2024-08-26 | End: 2025-08-26

## 2024-08-26 RX ORDER — PREDNISONE 10 MG/1
TABLET ORAL
Qty: 30 TABLET | Refills: 0 | Status: SHIPPED | OUTPATIENT
Start: 2024-08-26 | End: 2024-09-06

## 2024-08-26 NOTE — TELEPHONE ENCOUNTER
Spouse called and states he is still having problems with his hemorrhoids. He states the medication is working well and the hemorrhoid has shrunk to about half size.    Patient requesting more medication.

## 2024-08-26 NOTE — TELEPHONE ENCOUNTER
It looks as though Dr. Lange gave the patient a prescription for prednisone.  I can renew this and give him some topical cream to try to help as well.

## 2024-09-03 DIAGNOSIS — F17.200 CURRENT SMOKER: ICD-10-CM

## 2024-09-03 DIAGNOSIS — J43.9 PULMONARY EMPHYSEMA, UNSPECIFIED EMPHYSEMA TYPE (MULTI): ICD-10-CM

## 2024-09-03 RX ORDER — ALBUTEROL SULFATE 90 UG/1
2 INHALANT RESPIRATORY (INHALATION) EVERY 6 HOURS PRN
Qty: 54 G | Refills: 3 | Status: SHIPPED | OUTPATIENT
Start: 2024-09-03 | End: 2025-09-03

## 2024-09-23 ENCOUNTER — APPOINTMENT (OUTPATIENT)
Age: 79
End: 2024-09-23
Payer: MEDICARE

## 2024-09-23 VITALS
HEIGHT: 72 IN | SYSTOLIC BLOOD PRESSURE: 122 MMHG | DIASTOLIC BLOOD PRESSURE: 62 MMHG | HEART RATE: 65 BPM | OXYGEN SATURATION: 91 % | BODY MASS INDEX: 24.46 KG/M2 | WEIGHT: 180.6 LBS

## 2024-09-23 DIAGNOSIS — F32.A DEPRESSION, UNSPECIFIED DEPRESSION TYPE: Primary | ICD-10-CM

## 2024-09-23 PROCEDURE — 1160F RVW MEDS BY RX/DR IN RCRD: CPT | Performed by: FAMILY MEDICINE

## 2024-09-23 PROCEDURE — 4004F PT TOBACCO SCREEN RCVD TLK: CPT | Performed by: FAMILY MEDICINE

## 2024-09-23 PROCEDURE — 1159F MED LIST DOCD IN RCRD: CPT | Performed by: FAMILY MEDICINE

## 2024-09-23 PROCEDURE — 99214 OFFICE O/P EST MOD 30 MIN: CPT | Performed by: FAMILY MEDICINE

## 2024-09-23 PROCEDURE — 3074F SYST BP LT 130 MM HG: CPT | Performed by: FAMILY MEDICINE

## 2024-09-23 PROCEDURE — 3078F DIAST BP <80 MM HG: CPT | Performed by: FAMILY MEDICINE

## 2024-09-23 RX ORDER — SERTRALINE HYDROCHLORIDE 50 MG/1
TABLET, FILM COATED ORAL
Qty: 30 TABLET | Refills: 5 | Status: SHIPPED | OUTPATIENT
Start: 2024-09-23

## 2024-09-23 ASSESSMENT — PATIENT HEALTH QUESTIONNAIRE - PHQ9
2. FEELING DOWN, DEPRESSED OR HOPELESS: SEVERAL DAYS
SUM OF ALL RESPONSES TO PHQ9 QUESTIONS 1 AND 2: 2
1. LITTLE INTEREST OR PLEASURE IN DOING THINGS: SEVERAL DAYS

## 2024-09-23 NOTE — PROGRESS NOTES
Subjective   Patient ID: Eusebio Marroquin is a 79 y.o. male who presents for Depression.    HPI   Nothing wrong, just dont feel like a sh..  Dont do anything, mow yard.  SL-can be hours. EMA-0. Crying-0, melancholy-ok goes out fri and sat night. Appetite=, H-0,S-0 , counts things in 3s. Conc-I dont know, TR-++, I let things bother me.  Review of Systems  Denies chest pains pressure heaviness  Objective   /62   Pulse 65   Ht 1.829 m (6')   Wt 81.9 kg (180 lb 9.6 oz)   SpO2 91%   BMI 24.49 kg/m²     Physical Exam  Heart regular lungs clear abdomen soft.  Flat affect.  Assessment/Plan   Problem List Items Addressed This Visit    None  Visit Diagnoses         Codes    Depression, unspecified depression type    -  Primary F32.A    Relevant Medications    sertraline (Zoloft) 50 mg tablet    Other Relevant Orders    Follow Up In Primary Care        Recheck after trial of sertraline

## 2024-10-14 ENCOUNTER — OFFICE VISIT (OUTPATIENT)
Age: 79
End: 2024-10-14
Payer: MEDICARE

## 2024-10-14 VITALS
OXYGEN SATURATION: 91 % | DIASTOLIC BLOOD PRESSURE: 70 MMHG | HEART RATE: 58 BPM | BODY MASS INDEX: 24.38 KG/M2 | WEIGHT: 180 LBS | HEIGHT: 72 IN | SYSTOLIC BLOOD PRESSURE: 120 MMHG

## 2024-10-14 DIAGNOSIS — M19.90 OSTEOARTHRITIS, UNSPECIFIED OSTEOARTHRITIS TYPE, UNSPECIFIED SITE: ICD-10-CM

## 2024-10-14 DIAGNOSIS — M62.830 LUMBAR PARASPINAL MUSCLE SPASM: Primary | ICD-10-CM

## 2024-10-14 DIAGNOSIS — L98.9 SKIN LESION OF HAND: ICD-10-CM

## 2024-10-14 DIAGNOSIS — I25.10 CORONARY ARTERY DISEASE INVOLVING NATIVE HEART WITHOUT ANGINA PECTORIS, UNSPECIFIED VESSEL OR LESION TYPE: ICD-10-CM

## 2024-10-14 PROCEDURE — 99214 OFFICE O/P EST MOD 30 MIN: CPT | Performed by: FAMILY MEDICINE

## 2024-10-14 PROCEDURE — 3078F DIAST BP <80 MM HG: CPT | Performed by: FAMILY MEDICINE

## 2024-10-14 PROCEDURE — 4004F PT TOBACCO SCREEN RCVD TLK: CPT | Performed by: FAMILY MEDICINE

## 2024-10-14 PROCEDURE — 1159F MED LIST DOCD IN RCRD: CPT | Performed by: FAMILY MEDICINE

## 2024-10-14 PROCEDURE — 1160F RVW MEDS BY RX/DR IN RCRD: CPT | Performed by: FAMILY MEDICINE

## 2024-10-14 PROCEDURE — 3074F SYST BP LT 130 MM HG: CPT | Performed by: FAMILY MEDICINE

## 2024-10-14 RX ORDER — GABAPENTIN 600 MG/1
600 TABLET ORAL 3 TIMES DAILY
Qty: 90 TABLET | Refills: 11 | Status: SHIPPED | OUTPATIENT
Start: 2024-10-14 | End: 2025-10-14

## 2024-10-14 RX ORDER — ATORVASTATIN CALCIUM 40 MG/1
40 TABLET, FILM COATED ORAL DAILY
Qty: 90 TABLET | Refills: 3 | Status: SHIPPED | OUTPATIENT
Start: 2024-10-14 | End: 2025-10-14

## 2024-10-14 RX ORDER — TIZANIDINE HYDROCHLORIDE 4 MG/1
CAPSULE, GELATIN COATED ORAL 3 TIMES DAILY
COMMUNITY
Start: 2024-10-07 | End: 2024-10-14 | Stop reason: SDUPTHER

## 2024-10-14 RX ORDER — EZETIMIBE 10 MG/1
10 TABLET ORAL DAILY
Qty: 90 TABLET | Refills: 3 | Status: SHIPPED | OUTPATIENT
Start: 2024-10-14 | End: 2025-10-14

## 2024-10-14 RX ORDER — CLOPIDOGREL BISULFATE 75 MG/1
75 TABLET ORAL DAILY
Qty: 90 TABLET | Refills: 3 | Status: SHIPPED | OUTPATIENT
Start: 2024-10-14 | End: 2025-10-14

## 2024-10-14 RX ORDER — TIZANIDINE HYDROCHLORIDE 4 MG/1
4 CAPSULE, GELATIN COATED ORAL 3 TIMES DAILY
Qty: 21 CAPSULE | Refills: 2 | Status: SHIPPED | OUTPATIENT
Start: 2024-10-14 | End: 2024-10-21

## 2024-10-14 RX ORDER — ATENOLOL 25 MG/1
25 TABLET ORAL DAILY
Qty: 90 TABLET | Refills: 3 | Status: SHIPPED | OUTPATIENT
Start: 2024-10-14 | End: 2025-10-14

## 2024-10-14 NOTE — PROGRESS NOTES
Subjective   Patient ID: Eusebio Marroquin is a 79 y.o. male who presents for Follow-up (ER 10/7/24).    HPI   Here in emergency room follow-up.  Was not x-rayed in the ER.  Very good ER note showing that there were no red flag symptoms and certainly no neurologic involvement.  He confirms all of the above today.  He has had episodic spasms on the right lower lumbar paraspinals and it was relieved by the spasmolytics.  He is currently out we will refill if after 1 week he is not resolved completely then I would recommend x-ray.  No bowel bladder control no radicular symptoms.  Also has about a 1 cm BCC on the dorsum of the left hand  CAD no angina  Review of Systems  No fever or systemic symptoms.  Objective   /70   Pulse 58   Ht 1.829 m (6')   Wt 81.6 kg (180 lb)   SpO2 91%   BMI 24.41 kg/m²     Physical Exam  Monofilament is reported to be very lightly felt.  Ankle jerks absent bilaterally knee jerks +2 and equal bilaterally.  EHL strength is equal and full.  He is minimally tender to deep palpation on the right lumbar paraspinals, iliolumbar ligament area  Assessment/Plan   Problem List Items Addressed This Visit             ICD-10-CM    CAD (coronary artery disease) I25.10    Relevant Medications    atenolol (Tenormin) 25 mg tablet    atorvastatin (Lipitor) 40 mg tablet    clopidogrel (Plavix) 75 mg tablet    ezetimibe (Zetia) 10 mg tablet     Other Visit Diagnoses         Codes    Lumbar paraspinal muscle spasm    -  Primary M62.830    Relevant Medications    tiZANidine (Zanaflex) 4 mg capsule    Skin lesion of hand     L98.9    Relevant Orders    Referral to General Surgery    Osteoarthritis, unspecified osteoarthritis type, unspecified site     M19.90    Relevant Medications    gabapentin (Neurontin) 600 mg tablet

## 2024-10-15 ENCOUNTER — APPOINTMENT (OUTPATIENT)
Age: 79
End: 2024-10-15
Payer: MEDICARE

## 2024-10-17 ENCOUNTER — TELEPHONE (OUTPATIENT)
Age: 79
End: 2024-10-17
Payer: MEDICARE

## 2024-10-18 DIAGNOSIS — L98.9 SKIN LESION OF HAND: ICD-10-CM

## 2024-10-21 ENCOUNTER — TELEPHONE (OUTPATIENT)
Age: 79
End: 2024-10-21
Payer: COMMERCIAL

## 2024-10-21 DIAGNOSIS — M47.22 OSTEOARTHRITIS OF SPINE WITH RADICULOPATHY, CERVICAL REGION: Primary | ICD-10-CM

## 2024-10-22 ENCOUNTER — HOSPITAL ENCOUNTER (OUTPATIENT)
Dept: RADIOLOGY | Facility: HOSPITAL | Age: 79
Discharge: HOME | End: 2024-10-22
Payer: COMMERCIAL

## 2024-10-22 DIAGNOSIS — M47.22 OSTEOARTHRITIS OF SPINE WITH RADICULOPATHY, CERVICAL REGION: ICD-10-CM

## 2024-10-22 PROCEDURE — 72100 X-RAY EXAM L-S SPINE 2/3 VWS: CPT

## 2024-10-24 ENCOUNTER — APPOINTMENT (OUTPATIENT)
Dept: SURGERY | Facility: CLINIC | Age: 79
End: 2024-10-24
Payer: MEDICARE

## 2024-10-25 ENCOUNTER — TELEPHONE (OUTPATIENT)
Age: 79
End: 2024-10-25
Payer: COMMERCIAL

## 2024-10-25 DIAGNOSIS — M47.816 ARTHRITIS, LUMBAR SPINE: ICD-10-CM

## 2024-10-25 DIAGNOSIS — M62.830 LUMBAR PARASPINAL MUSCLE SPASM: ICD-10-CM

## 2024-10-25 NOTE — TELEPHONE ENCOUNTER
----- Message from Kamari Thayer sent at 10/25/2024  1:40 PM EDT -----  There is a lot of arthritis changes which could be generating pain.  If interested in better analgesia he can be referred to pain clinic    Pt notified and agreed to see PM. Referral placed.

## 2024-11-05 ENCOUNTER — OFFICE VISIT (OUTPATIENT)
Dept: PAIN MEDICINE | Facility: CLINIC | Age: 79
End: 2024-11-05
Payer: MEDICARE

## 2024-11-05 VITALS — RESPIRATION RATE: 16 BRPM | SYSTOLIC BLOOD PRESSURE: 108 MMHG | DIASTOLIC BLOOD PRESSURE: 69 MMHG | HEART RATE: 73 BPM

## 2024-11-05 DIAGNOSIS — M62.830 LUMBAR PARASPINAL MUSCLE SPASM: ICD-10-CM

## 2024-11-05 DIAGNOSIS — M47.816 ARTHRITIS, LUMBAR SPINE: ICD-10-CM

## 2024-11-05 PROCEDURE — 99214 OFFICE O/P EST MOD 30 MIN: CPT

## 2024-11-05 RX ORDER — BUPIVACAINE HYDROCHLORIDE 5 MG/ML
3 INJECTION, SOLUTION EPIDURAL; INTRACAUDAL ONCE
OUTPATIENT
Start: 2024-11-05 | End: 2024-11-05

## 2024-11-05 RX ORDER — LIDOCAINE HYDROCHLORIDE 20 MG/ML
10 INJECTION, SOLUTION EPIDURAL; INFILTRATION; INTRACAUDAL; PERINEURAL ONCE
OUTPATIENT
Start: 2024-11-05 | End: 2024-11-05

## 2024-11-05 ASSESSMENT — PATIENT HEALTH QUESTIONNAIRE - PHQ9
SUM OF ALL RESPONSES TO PHQ9 QUESTIONS 1 AND 2: 0
2. FEELING DOWN, DEPRESSED OR HOPELESS: NOT AT ALL
1. LITTLE INTEREST OR PLEASURE IN DOING THINGS: NOT AT ALL

## 2024-11-05 ASSESSMENT — ENCOUNTER SYMPTOMS
DYSPHORIC MOOD: 0
FACIAL ASYMMETRY: 0
BACK PAIN: 1
COUGH: 0
WEAKNESS: 0
ADENOPATHY: 0
EYES NEGATIVE: 1
MYALGIAS: 1
ARTHRALGIAS: 0
BRUISES/BLEEDS EASILY: 0
LIGHT-HEADEDNESS: 0
CONSTITUTIONAL NEGATIVE: 1
ENDOCRINE NEGATIVE: 1
ALLERGIC/IMMUNOLOGIC NEGATIVE: 1
PALPITATIONS: 0
SHORTNESS OF BREATH: 0
WHEEZING: 0

## 2024-11-05 ASSESSMENT — COLUMBIA-SUICIDE SEVERITY RATING SCALE - C-SSRS
2. HAVE YOU ACTUALLY HAD ANY THOUGHTS OF KILLING YOURSELF?: NO
1. IN THE PAST MONTH, HAVE YOU WISHED YOU WERE DEAD OR WISHED YOU COULD GO TO SLEEP AND NOT WAKE UP?: NO
6. HAVE YOU EVER DONE ANYTHING, STARTED TO DO ANYTHING, OR PREPARED TO DO ANYTHING TO END YOUR LIFE?: NO

## 2024-11-05 NOTE — PROGRESS NOTES
"Subjective   Patient ID: Eusebio Marroquin is a 79 y.o. male who presents for Back Pain (NPV here for evaluation of Rt side lower back pain, does not radiate, rates 2/10 now and 10/10 at worst activity does not matter, describes as \"constant hurt\" and when the pain is bad stabbing.  The pain started 4 months ago. He denies an injury but he has had multiple falls in the past year he states \"I don't remember,\" he has tried Tylenol, Ibuprofen sparingly d/t kidney disease, Gabapentin 600mg TID, tizanidine these only help his pain a little. ) MARLA score 30%, SOAPP score 6, screenings Depression negative, she smokes daily and has fallen could be related to COVID and pneumonia education  provided for both.   Aubree Lacey RN 11/05/24 1:30 PM     Patient is an 79-year-old male who presents today as a new patient referred to us by his PCP for low back pain.  The patient reports that it became noticeable approximately 4 months ago.  He denies any sort of inciting injury or event.  He currently rates his pain a 2/10, but says it can get up to 10/10 at its worst, and that the pain is pretty well constant and when it gets severe it can be a stabbing pain, worse in the right side of his lower back.  He has tried Tylenol, ibuprofen (sparingly), gabapentin, and tizanidine, none of which brought any meaningful pain relief.  He had x-rays done by his PCP, and is open to any injection options we have to offer to help with this pain.        Review of Systems   Constitutional: Negative.    HENT: Negative.     Eyes: Negative.    Respiratory:  Negative for cough, shortness of breath and wheezing.    Cardiovascular:  Negative for chest pain, palpitations and leg swelling.   Endocrine: Negative.    Genitourinary: Negative.    Musculoskeletal:  Positive for back pain and myalgias. Negative for arthralgias.   Skin: Negative.    Allergic/Immunologic: Negative.    Neurological:  Negative for facial asymmetry, weakness and light-headedness. "   Hematological:  Negative for adenopathy. Does not bruise/bleed easily.   Psychiatric/Behavioral:  Negative for dysphoric mood and suicidal ideas.        Objective   Physical Exam  Constitutional:       General: He is not in acute distress.     Appearance: Normal appearance.   HENT:      Head: Normocephalic.      Mouth/Throat:      Mouth: Mucous membranes are moist.   Eyes:      Extraocular Movements: Extraocular movements intact.   Cardiovascular:      Rate and Rhythm: Normal rate and regular rhythm.      Pulses: Normal pulses.      Heart sounds: Normal heart sounds. No murmur heard.     No friction rub. No gallop.   Pulmonary:      Effort: Pulmonary effort is normal.      Breath sounds: Normal breath sounds. No wheezing, rhonchi or rales.   Abdominal:      General: Abdomen is flat.      Palpations: Abdomen is soft.   Musculoskeletal:      Cervical back: Normal range of motion.      Right lower leg: No edema.      Left lower leg: No edema.      Comments: Ambulates without assistance  Strength 5/5 BLE  No lumbar paraspinal tenderness to palpation  Facet loading positive  Yfn finger negative bilaterally   Lymphadenopathy:      Cervical: No cervical adenopathy.   Skin:     General: Skin is warm and dry.   Neurological:      General: No focal deficit present.      Mental Status: He is alert and oriented to person, place, and time. Mental status is at baseline.   Psychiatric:         Mood and Affect: Mood normal.         Behavior: Behavior normal.         Assessment/Plan   Diagnoses and all orders for this visit:  Lumbar paraspinal muscle spasm  -     Referral to Pain Medicine  Arthritis, lumbar spine  -     Referral to Pain Medicine  -     FL pain management; Future  -      Medial Nerve Branch Block; Future  Other orders  -     iohexol (OMNIPaque) 300 mg iodine/mL solution 3 mL  -     lidocaine PF (Xylocaine) 20 mg/mL (2 %) injection 200 mg  -     bupivacaine PF 0.5 % (Marcaine) 0.5 % (5 mg/mL) injection 15 mg  -      NPO Diet Except: Sips with meds; Effective now; Standing  -     Height and weight; Standing  -     Type And Screen; Standing  -     Inpatient consult to Respiratory Care; Standing  -     Adult diet Regular; Standing  -     Vital Signs; Standing  -     Notify physician - Standard Parameters; Standing  -     Continue IV fluids ordered pre-procedure; Standing  -     Prior to Discharge O2 Weaning; Standing  -     Pulse oximetry, continuous; Standing  -     Discharge patient; Standing       The patient is an 79-year-old male with a past medical history significant for neck pain, cervical disc degeneration, cervical radiculopathy, bilateral shoulder arthritis, lumbar facet arthropathy, cervical osteoarthritis.  He currently rates his pain a 2/10, but says it can get up to 10/10 at its worst primarily across his lower back, worse on the right side.  The pain does not radiate anywhere, and is pretty constant, not necessarily exacerbated by any particular activity.  He says the only time he is able to walk pain-free as if he walks very hunched over.  We reviewed the x-rays that were ordered by his PCP and based on findings, his pain pattern, his physical exam, his failure to improve with previous conservative treatment, I recommend pursuing a bilateral medial branch block covering the L4-S1 facet joints under fluoroscopy.  The procedure was discussed, risks and benefits were discussed, patient is agreeable.  Med holds include numerous vitamins/supplements for 1 week, and Plavix for 1 week.  He will follow-up after the injection for reevaluation, call clinic sooner if needed.

## 2024-11-13 ENCOUNTER — TELEPHONE (OUTPATIENT)
Dept: PAIN MEDICINE | Facility: CLINIC | Age: 79
End: 2024-11-13
Payer: MEDICARE

## 2024-11-25 ENCOUNTER — APPOINTMENT (OUTPATIENT)
Age: 79
End: 2024-11-25
Payer: MEDICARE

## 2024-11-25 VITALS
WEIGHT: 179.2 LBS | DIASTOLIC BLOOD PRESSURE: 58 MMHG | HEART RATE: 83 BPM | SYSTOLIC BLOOD PRESSURE: 118 MMHG | HEIGHT: 72 IN | OXYGEN SATURATION: 92 % | BODY MASS INDEX: 24.27 KG/M2

## 2024-11-25 DIAGNOSIS — F32.A DEPRESSION, UNSPECIFIED DEPRESSION TYPE: ICD-10-CM

## 2024-11-25 PROCEDURE — 3074F SYST BP LT 130 MM HG: CPT | Performed by: FAMILY MEDICINE

## 2024-11-25 PROCEDURE — 99213 OFFICE O/P EST LOW 20 MIN: CPT | Performed by: FAMILY MEDICINE

## 2024-11-25 PROCEDURE — 3078F DIAST BP <80 MM HG: CPT | Performed by: FAMILY MEDICINE

## 2024-11-25 PROCEDURE — 1159F MED LIST DOCD IN RCRD: CPT | Performed by: FAMILY MEDICINE

## 2024-11-25 PROCEDURE — 1160F RVW MEDS BY RX/DR IN RCRD: CPT | Performed by: FAMILY MEDICINE

## 2024-11-25 PROCEDURE — 4004F PT TOBACCO SCREEN RCVD TLK: CPT | Performed by: FAMILY MEDICINE

## 2024-11-25 RX ORDER — SERTRALINE HYDROCHLORIDE 50 MG/1
TABLET, FILM COATED ORAL
Qty: 90 TABLET | Refills: 3 | Status: SHIPPED | OUTPATIENT
Start: 2024-11-25

## 2024-11-25 NOTE — PROGRESS NOTES
Subjective   Patient ID: Eusebio Marroquin is a 79 y.o. male who presents for Follow-up (2 mo).    HPI   Felt better on zoloft so stopped it recently (30d) agree to resstart for a 9-12m course.  We will check in 6 months.  Review of Systems    Objective   /58   Pulse 83   Ht 1.829 m (6')   Wt 81.3 kg (179 lb 3.2 oz)   SpO2 92%   BMI 24.30 kg/m²     Physical Exam  Mood remains good he feels he has better perspective outlook on living.  Assessment/Plan   Problem List Items Addressed This Visit    None  Visit Diagnoses         Codes    Depression, unspecified depression type     F32.A    Relevant Medications    sertraline (Zoloft) 50 mg tablet    Other Relevant Orders    Follow Up In Primary Care

## 2024-12-06 ENCOUNTER — HOSPITAL ENCOUNTER (OUTPATIENT)
Dept: OPERATING ROOM | Facility: HOSPITAL | Age: 79
Setting detail: OUTPATIENT SURGERY
Discharge: HOME | End: 2024-12-06
Payer: MEDICARE

## 2024-12-06 VITALS
HEART RATE: 66 BPM | OXYGEN SATURATION: 94 % | WEIGHT: 180 LBS | TEMPERATURE: 98 F | BODY MASS INDEX: 24.41 KG/M2 | DIASTOLIC BLOOD PRESSURE: 70 MMHG | SYSTOLIC BLOOD PRESSURE: 134 MMHG | RESPIRATION RATE: 16 BRPM

## 2024-12-06 DIAGNOSIS — M47.816 ARTHRITIS, LUMBAR SPINE: ICD-10-CM

## 2024-12-06 PROCEDURE — 2500000004 HC RX 250 GENERAL PHARMACY W/ HCPCS (ALT 636 FOR OP/ED): Performed by: STUDENT IN AN ORGANIZED HEALTH CARE EDUCATION/TRAINING PROGRAM

## 2024-12-06 PROCEDURE — 64494 INJ PARAVERT F JNT L/S 2 LEV: CPT | Mod: 50 | Performed by: STUDENT IN AN ORGANIZED HEALTH CARE EDUCATION/TRAINING PROGRAM

## 2024-12-06 PROCEDURE — 2550000001 HC RX 255 CONTRASTS: Performed by: STUDENT IN AN ORGANIZED HEALTH CARE EDUCATION/TRAINING PROGRAM

## 2024-12-06 RX ORDER — BUPIVACAINE HYDROCHLORIDE 5 MG/ML
INJECTION, SOLUTION EPIDURAL; INTRACAUDAL AS NEEDED
Status: COMPLETED | OUTPATIENT
Start: 2024-12-06 | End: 2024-12-06

## 2024-12-06 RX ORDER — LIDOCAINE HYDROCHLORIDE 20 MG/ML
INJECTION, SOLUTION EPIDURAL; INFILTRATION; INTRACAUDAL; PERINEURAL AS NEEDED
Status: COMPLETED | OUTPATIENT
Start: 2024-12-06 | End: 2024-12-06

## 2024-12-06 ASSESSMENT — PAIN - FUNCTIONAL ASSESSMENT: PAIN_FUNCTIONAL_ASSESSMENT: 0-10

## 2024-12-06 ASSESSMENT — COLUMBIA-SUICIDE SEVERITY RATING SCALE - C-SSRS
6. HAVE YOU EVER DONE ANYTHING, STARTED TO DO ANYTHING, OR PREPARED TO DO ANYTHING TO END YOUR LIFE?: NO
1. IN THE PAST MONTH, HAVE YOU WISHED YOU WERE DEAD OR WISHED YOU COULD GO TO SLEEP AND NOT WAKE UP?: NO
2. HAVE YOU ACTUALLY HAD ANY THOUGHTS OF KILLING YOURSELF?: NO

## 2024-12-06 ASSESSMENT — PAIN SCALES - GENERAL
PAINLEVEL_OUTOF10: 4
PAINLEVEL_OUTOF10: 0 - NO PAIN

## 2024-12-06 NOTE — PERIOPERATIVE NURSING NOTE
Patient educated on burn on back.  Assessed by Dr. Herzog as burn from heating pad.  Patient educated on use of heating pad for long periods of time.  He states he will completely stop using heating pad.  Patient verbalized understanding on burn and instructions and denied questions.

## 2024-12-06 NOTE — H&P
History Of Present Illness  Eusebio Marroquin is a 79 y.o. male presenting with pain.     Past Medical History  Past Medical History:   Diagnosis Date    Acute myocardial infarction, unspecified     AMI (acute myocardial infarction)    Calculus of kidney 2022    Left renal stone    Personal history of other diseases of male genital organs     History of erectile dysfunction    Personal history of other diseases of urinary system     History of hydronephrosis    Personal history of other specified conditions     History of nocturia    Personal history of urinary calculi     History of kidney stones       Surgical History  Past Surgical History:   Procedure Laterality Date    OTHER SURGICAL HISTORY  2019    Colonoscopy    OTHER SURGICAL HISTORY  2019    Abdominal aortic aneurysm repair    OTHER SURGICAL HISTORY  2019    Cardiac catheterization with stent placement    OTHER SURGICAL HISTORY  2022    Diagnostic nerve block    OTHER SURGICAL HISTORY  2021    Epidural steroid injection    OTHER SURGICAL HISTORY  2022    Intra-articular injection    SHOULDER ARTHROSCOPY Right 2023        Social History  He reports that he has been smoking cigarettes. He has been exposed to tobacco smoke. He has never used smokeless tobacco. He reports that he does not currently use alcohol. He reports that he does not currently use drugs.    Family History  Family History   Problem Relation Name Age of Onset    Heart attack Mother              Other (CVA) Mother      Hyperlipidemia Mother      Hypertension Mother      Other (CABG) Mother      Heart attack Father              Hyperlipidemia Father      Hypertension Father      Depression Sister          Allergies  Oxycodone-acetaminophen and Cefadroxil    Review of Systems   All other systems reviewed and are negative.       Physical Exam     Last Recorded Vitals  There were no vitals taken for this visit.    Relevant  Results        Constitutional: No acute distress, well appearing and well nourished. Patient appears stated age.  Eyes:  nonicteric sclerae   ENT: Hearing is grossly intact.  Neck: trachea midline  Head and Face: grossly normal.  Respiratory: nonlabored breathing  Cardiovascular: rate per vitals.  Neuro: alert, moving extremities.       Assessment/Plan   Assessment & Plan  Arthritis, lumbar spine      Lumbar mbb's       I spent   minutes in the professional and overall care of this patient.      Terry Herzog, DO

## 2024-12-06 NOTE — PERIOPERATIVE NURSING NOTE
Discharge instructions reviewed by Rachel GONZALEZ RN  no questions and verbalized understanding.   discharged amb steady gait, to exit  to be driven home by family hai well

## 2024-12-09 ENCOUNTER — OFFICE VISIT (OUTPATIENT)
Dept: PAIN MEDICINE | Facility: CLINIC | Age: 79
End: 2024-12-09
Payer: MEDICARE

## 2024-12-09 VITALS
DIASTOLIC BLOOD PRESSURE: 81 MMHG | SYSTOLIC BLOOD PRESSURE: 135 MMHG | WEIGHT: 179 LBS | HEART RATE: 89 BPM | RESPIRATION RATE: 18 BRPM | BODY MASS INDEX: 24.28 KG/M2

## 2024-12-09 DIAGNOSIS — M47.816 ARTHRITIS, LUMBAR SPINE: Primary | ICD-10-CM

## 2024-12-09 PROCEDURE — 99213 OFFICE O/P EST LOW 20 MIN: CPT

## 2024-12-09 ASSESSMENT — ENCOUNTER SYMPTOMS
ADENOPATHY: 0
CONSTITUTIONAL NEGATIVE: 1
BACK PAIN: 1
WEAKNESS: 0
BRUISES/BLEEDS EASILY: 0
DYSPHORIC MOOD: 0
EYES NEGATIVE: 1
MYALGIAS: 1
ENDOCRINE NEGATIVE: 1
ALLERGIC/IMMUNOLOGIC NEGATIVE: 1
FACIAL ASYMMETRY: 0
COUGH: 0
WHEEZING: 0
PALPITATIONS: 0
SHORTNESS OF BREATH: 0
LIGHT-HEADEDNESS: 0
ARTHRALGIAS: 0

## 2024-12-09 ASSESSMENT — COLUMBIA-SUICIDE SEVERITY RATING SCALE - C-SSRS
1. IN THE PAST MONTH, HAVE YOU WISHED YOU WERE DEAD OR WISHED YOU COULD GO TO SLEEP AND NOT WAKE UP?: NO
6. HAVE YOU EVER DONE ANYTHING, STARTED TO DO ANYTHING, OR PREPARED TO DO ANYTHING TO END YOUR LIFE?: NO
2. HAVE YOU ACTUALLY HAD ANY THOUGHTS OF KILLING YOURSELF?: NO

## 2024-12-09 NOTE — PROGRESS NOTES
Subjective   Patient ID: Eusebio Marroquin is a 79 y.o. male who presents for Follow-up (FUV for Bilat L4/5-L5/S1 MBB he reports 100% pain relief and still working, he was able to do some work outside that he normally has severe pain with and was just a little sore. Today reports 0 pain and has not had pain since the injection. He reports he was sore from working outside and took ibuprofen and it helped his pain, before his injection Ibuprofen did not help much. ) He did not hear his instructions to restart his medication as marked on his discharge instructions and request his wife be involved in the discharge instructions. MARLA score 4%, Screening Alcohol negative.  Aubree Lacey RN 12/09/24 11:13 AM     The patient is an 79-year-old male who presents today for follow-up after undergoing his first bilateral medial branch block covering the L4-S1 facet joints on 12/6/2024.  The patient reports significant relief from this procedure with 100% pain relief.  He is not having any pain at this time, and yesterday was able to go and work out in his yard without any pain.  He said he was only sore after this.  He says since the injection he has not had any pain at all.  Because of this, he does not want to move forward with the second step at this time.        Review of Systems   Constitutional: Negative.    HENT: Negative.     Eyes: Negative.    Respiratory:  Negative for cough, shortness of breath and wheezing.    Cardiovascular:  Negative for chest pain, palpitations and leg swelling.   Endocrine: Negative.    Genitourinary: Negative.    Musculoskeletal:  Positive for back pain and myalgias. Negative for arthralgias.   Skin: Negative.    Allergic/Immunologic: Negative.    Neurological:  Negative for facial asymmetry, weakness and light-headedness.   Hematological:  Negative for adenopathy. Does not bruise/bleed easily.   Psychiatric/Behavioral:  Negative for dysphoric mood and suicidal ideas.        Objective   Physical  Exam  Constitutional:       General: He is not in acute distress.     Appearance: Normal appearance.   HENT:      Head: Normocephalic.      Mouth/Throat:      Mouth: Mucous membranes are moist.   Eyes:      Extraocular Movements: Extraocular movements intact.   Cardiovascular:      Rate and Rhythm: Normal rate and regular rhythm.      Pulses: Normal pulses.      Heart sounds: Normal heart sounds. No murmur heard.     No friction rub. No gallop.   Pulmonary:      Effort: Pulmonary effort is normal.      Breath sounds: Normal breath sounds. No wheezing, rhonchi or rales.   Abdominal:      General: Abdomen is flat.      Palpations: Abdomen is soft.   Musculoskeletal:      Cervical back: Normal range of motion.      Right lower leg: No edema.      Left lower leg: No edema.      Comments: Ambulates without assistance  Strength 5/5 BLE  2+ pitting edema noted to bilateral lower extremities   Lymphadenopathy:      Cervical: No cervical adenopathy.   Skin:     General: Skin is warm and dry.   Neurological:      General: No focal deficit present.      Mental Status: He is alert and oriented to person, place, and time. Mental status is at baseline.   Psychiatric:         Mood and Affect: Mood normal.         Behavior: Behavior normal.         Assessment/Plan   Diagnoses and all orders for this visit:  Arthritis, lumbar spine       The patient is a 79-year-old male with a past medical history significant for the above-mentioned problem.  He is following up after lumbar MBB with significant ongoing relief.  He does not have any pain complaints at this time.  He did mention bilateral lower extremity swelling, noted to be pitting edema.  Advised the patient to follow-up with his PCP regarding this problem, patient voiced understanding and said he would call.  Otherwise from our standpoint, he can follow-up with us on an as-needed basis, call the clinic if needed.

## 2024-12-23 ENCOUNTER — TELEPHONE (OUTPATIENT)
Dept: PAIN MEDICINE | Facility: CLINIC | Age: 79
End: 2024-12-23
Payer: MEDICARE

## 2024-12-23 NOTE — TELEPHONE ENCOUNTER
----- Message from Nurse Stacey CABRERA sent at 11/5/2024  3:35 PM EST -----    ----- Message -----  From: Kamari Thayer MD  Sent: 11/5/2024   2:47 PM EST  To: Stacey Frankel RN    Yes  ----- Message -----  From: Stacey Frankel RN  Sent: 11/5/2024   2:33 PM EST  To: MD Dr. Flaca Myers;  Pt. Is scheduled for a MBBx2 + RFA series with Dr. Vamsi Herzog.  Is it acceptable to you that he hold his Plavix for 7 da7s prior to and 24 hours after his procedure?  Thank you,  Stacey

## 2025-01-23 ENCOUNTER — APPOINTMENT (OUTPATIENT)
Age: 80
End: 2025-01-23
Payer: MEDICARE

## 2025-01-23 VITALS
DIASTOLIC BLOOD PRESSURE: 58 MMHG | SYSTOLIC BLOOD PRESSURE: 120 MMHG | BODY MASS INDEX: 23.87 KG/M2 | HEART RATE: 67 BPM | OXYGEN SATURATION: 91 % | WEIGHT: 176 LBS

## 2025-01-23 DIAGNOSIS — M19.012 ARTHROPATHY OF LEFT SHOULDER: ICD-10-CM

## 2025-01-23 DIAGNOSIS — E78.00 HYPERCHOLESTEROLEMIA: ICD-10-CM

## 2025-01-23 DIAGNOSIS — F17.200 CURRENT SMOKER: ICD-10-CM

## 2025-01-23 DIAGNOSIS — I25.10 CORONARY ARTERY DISEASE INVOLVING NATIVE HEART WITHOUT ANGINA PECTORIS, UNSPECIFIED VESSEL OR LESION TYPE: ICD-10-CM

## 2025-01-23 DIAGNOSIS — I10 HYPERTENSION, ESSENTIAL, BENIGN: Primary | ICD-10-CM

## 2025-01-23 DIAGNOSIS — Z00.00 ROUTINE GENERAL MEDICAL EXAMINATION AT HEALTH CARE FACILITY: ICD-10-CM

## 2025-01-23 DIAGNOSIS — M19.011 ARTHROPATHY OF RIGHT SHOULDER: ICD-10-CM

## 2025-01-23 PROCEDURE — 1160F RVW MEDS BY RX/DR IN RCRD: CPT | Performed by: FAMILY MEDICINE

## 2025-01-23 PROCEDURE — 3078F DIAST BP <80 MM HG: CPT | Performed by: FAMILY MEDICINE

## 2025-01-23 PROCEDURE — 99214 OFFICE O/P EST MOD 30 MIN: CPT | Performed by: FAMILY MEDICINE

## 2025-01-23 PROCEDURE — 3074F SYST BP LT 130 MM HG: CPT | Performed by: FAMILY MEDICINE

## 2025-01-23 PROCEDURE — 1159F MED LIST DOCD IN RCRD: CPT | Performed by: FAMILY MEDICINE

## 2025-01-23 NOTE — PROGRESS NOTES
Subjective   Patient ID: Eusebio Marroquin is a 79 y.o. male who presents for Follow-up (6 mo).    HPI Since the last office visit there have been no interval operations, hospitalizations, important illnesses or injuries.  Has been to pain clinic, got shots in back, consider rhiz.  Got great relief after 1 shot, so no further tx. To returnif relapse.  Has monika to hs only  copd- no exacerbations since last ov.  , albabout bid  Hyperlipidemia- is on a statin and a prudent diet.  CAD- no angina  Smokes 1/2 ppd , improved, cessation advised  On sertraline for mood.  Not getting upset anymore.  Review of Systems  General-no fatigue weight to within 10 pounds  ENT no problems with vision swallowing  Cardiac no chest pains palpitations change in exercise tolerance or capacity  Pulmonary no cough shortness of breath  GI no heartburn or abdominal pain  Musculoskeletal no joint pains  Objective   /58   Pulse 67   Wt 79.8 kg (176 lb)   SpO2 91%   BMI 23.87 kg/m²     Physical Exam  General:  Alert, No acute distress. Appears stated age  Eye:  Pupils are equal, round and reactive to light, Extraocular movements are intact, Normal conjunctiva.    Neck:  Supple, Non-tender, No carotid bruit, No jugular venous distention, No lymphadenopathy, No thyromegaly.    Respiratory:  Lungs are clear to auscultation, Respirations are non-labored, Breath sounds are equal.    Cardiovascular:  Normal rate, Regular rhythm, No murmur.    Gastrointestinal:  Soft, Non-tender, No organomegaly. No solid or pulsatile mass  Integumentary:  Warm, Dry. No concerning lesions on exposed areas  Neurologic:  Alert, Oriented.  Gross and fine motor intact, CN 2-12 intact  Psychiatric:  Cooperative, Appropriate mood & affect.  Assessment/Plan   Problem List Items Addressed This Visit             ICD-10-CM    Arthropathy of left shoulder M19.012    Relevant Orders    Follow Up In Primary Care    Arthropathy of right shoulder M19.011    Relevant Orders     Follow Up In Primary Care    CAD (coronary artery disease) I25.10    Relevant Orders    CBC    Comprehensive Metabolic Panel    Lipid Panel    Follow Up In Primary Care    Current smoker F17.200    Relevant Orders    Follow Up In Primary Care    Hypercholesterolemia E78.00    Relevant Orders    CBC    Comprehensive Metabolic Panel    Lipid Panel    Follow Up In Primary Care    Hypertension, essential, benign - Primary I10    Relevant Orders    CBC    Comprehensive Metabolic Panel    Lipid Panel    Follow Up In Primary Care     Other Visit Diagnoses         Codes    Routine general medical examination at health care facility     Z00.00    Relevant Orders    Follow Up In Primary Care

## 2025-02-27 ENCOUNTER — APPOINTMENT (OUTPATIENT)
Dept: PLASTIC SURGERY | Facility: CLINIC | Age: 80
End: 2025-02-27
Payer: MEDICARE

## 2025-03-03 DIAGNOSIS — F32.A DEPRESSION, UNSPECIFIED DEPRESSION TYPE: ICD-10-CM

## 2025-03-03 RX ORDER — SERTRALINE HYDROCHLORIDE 50 MG/1
TABLET, FILM COATED ORAL
Qty: 90 TABLET | Refills: 3 | Status: SHIPPED | OUTPATIENT
Start: 2025-03-03

## 2025-03-13 ENCOUNTER — OFFICE VISIT (OUTPATIENT)
Age: 80
End: 2025-03-13
Payer: MEDICARE

## 2025-03-13 VITALS — DIASTOLIC BLOOD PRESSURE: 70 MMHG | OXYGEN SATURATION: 88 % | SYSTOLIC BLOOD PRESSURE: 110 MMHG | HEART RATE: 113 BPM

## 2025-03-13 DIAGNOSIS — M25.511 ACUTE PAIN OF RIGHT SHOULDER: Primary | ICD-10-CM

## 2025-03-13 DIAGNOSIS — J43.9 PULMONARY EMPHYSEMA, UNSPECIFIED EMPHYSEMA TYPE (MULTI): ICD-10-CM

## 2025-03-13 DIAGNOSIS — N18.31 STAGE 3A CHRONIC KIDNEY DISEASE (MULTI): ICD-10-CM

## 2025-03-13 PROCEDURE — 3074F SYST BP LT 130 MM HG: CPT | Performed by: FAMILY MEDICINE

## 2025-03-13 PROCEDURE — 3078F DIAST BP <80 MM HG: CPT | Performed by: FAMILY MEDICINE

## 2025-03-13 PROCEDURE — 1159F MED LIST DOCD IN RCRD: CPT | Performed by: FAMILY MEDICINE

## 2025-03-13 PROCEDURE — 1160F RVW MEDS BY RX/DR IN RCRD: CPT | Performed by: FAMILY MEDICINE

## 2025-03-13 PROCEDURE — 99213 OFFICE O/P EST LOW 20 MIN: CPT | Performed by: FAMILY MEDICINE

## 2025-03-13 RX ORDER — BUDESONIDE, GLYCOPYRROLATE, AND FORMOTEROL FUMARATE 160; 9; 4.8 UG/1; UG/1; UG/1
2 AEROSOL, METERED RESPIRATORY (INHALATION)
Qty: 10.7 G | Refills: 11 | Status: SHIPPED | OUTPATIENT
Start: 2025-03-13 | End: 2026-03-13

## 2025-03-13 RX ORDER — TRAMADOL HYDROCHLORIDE 50 MG/1
50 TABLET ORAL EVERY 6 HOURS PRN
Qty: 28 TABLET | Refills: 0 | Status: SHIPPED | OUTPATIENT
Start: 2025-03-13 | End: 2025-03-18

## 2025-03-13 NOTE — PROGRESS NOTES
Subjective   Patient ID: Eusebio Marroquin is a 79 y.o. male who presents for Follow-up (ER 3/5/25).    HPI   Seen in ER for mechanical fall on  (on Plavix,) onto right shoulder. XR showed no fractures/dislocation. CT head/spine workup negative. Discharged without pain mgmt.   Prior right glenohumeral arthroplasty with Dr. Wade.     Significant pain to right shoulder with palpation/manipulation. Has been taking ibu 400mg every 6 hours.     COPD - denies SOB outside of norm. Oxygen saturation historically low 90s. Using albuterol inhaler, has not been taking Breztri as has run out/ order.    Review of Systems  Denies fever or chills.  Wife notes more repeated questioning since injury though symptoms were present prior on MMSE he was very slow to answer but he got orientation -2 for date and purpose of building.  And he could only recall 1 of 3.  Remainder of the language was not tested because of the difficulties with the HPI.  When he for comes back in May we should do MMSE and talk about treatment of dementia  Objective   /70   Pulse (!) 113   SpO2 (!) 88%     Physical Exam  Was not able to put the right shoulder through range of motion because of discomfort  Assessment/Plan   Problem List Items Addressed This Visit             ICD-10-CM    Shoulder pain - Primary M25.519    Relevant Medications    traMADol (Ultram) 50 mg tablet    Other Relevant Orders    Referral to Orthopaedic Surgery    Stage 3a chronic kidney disease (Multi) N18.31    Pulmonary emphysema, unspecified emphysema type (Multi) J43.9    Relevant Medications    budesonide-glycopyr-formoterol (Breztri Aerosphere) 160-9-4.8 mcg/actuation HFA aerosol inhaler

## 2025-03-27 ENCOUNTER — TELEPHONE (OUTPATIENT)
Age: 80
End: 2025-03-27
Payer: MEDICARE

## 2025-03-27 NOTE — TELEPHONE ENCOUNTER
WIFE BLAZE CALLED, PATIENT HAS NOT BEEN EATING FOR 3 WKS.  THIS WEEK HAS HAD  VOMITING AND DIARRHEA.  BLAZE THINKS HE IS DEHYDRATED.    ADVISED  TO HAVE PATIENT EVALUATED IN ER.

## 2025-03-30 ENCOUNTER — HOSPITAL ENCOUNTER (EMERGENCY)
Facility: HOSPITAL | Age: 80
Discharge: HOME | End: 2025-03-30
Attending: EMERGENCY MEDICINE
Payer: MEDICARE

## 2025-03-30 ENCOUNTER — APPOINTMENT (OUTPATIENT)
Dept: CARDIOLOGY | Facility: HOSPITAL | Age: 80
End: 2025-03-30
Payer: MEDICARE

## 2025-03-30 ENCOUNTER — APPOINTMENT (OUTPATIENT)
Dept: RADIOLOGY | Facility: HOSPITAL | Age: 80
End: 2025-03-30
Payer: MEDICARE

## 2025-03-30 VITALS
WEIGHT: 158 LBS | HEART RATE: 67 BPM | OXYGEN SATURATION: 96 % | DIASTOLIC BLOOD PRESSURE: 74 MMHG | BODY MASS INDEX: 21.4 KG/M2 | HEIGHT: 72 IN | TEMPERATURE: 98.2 F | RESPIRATION RATE: 18 BRPM | SYSTOLIC BLOOD PRESSURE: 115 MMHG

## 2025-03-30 DIAGNOSIS — R53.1 GENERALIZED WEAKNESS: Primary | ICD-10-CM

## 2025-03-30 DIAGNOSIS — M25.511 CHRONIC RIGHT SHOULDER PAIN: ICD-10-CM

## 2025-03-30 DIAGNOSIS — G89.29 CHRONIC RIGHT SHOULDER PAIN: ICD-10-CM

## 2025-03-30 DIAGNOSIS — R63.0 LOSS OF APPETITE: ICD-10-CM

## 2025-03-30 LAB
ALBUMIN SERPL BCP-MCNC: 3.3 G/DL (ref 3.4–5)
ALP SERPL-CCNC: 87 U/L (ref 33–136)
ALT SERPL W P-5'-P-CCNC: 42 U/L (ref 10–52)
ANION GAP SERPL CALC-SCNC: 10 MMOL/L (ref 10–20)
APPEARANCE UR: CLEAR
AST SERPL W P-5'-P-CCNC: 30 U/L (ref 9–39)
BACTERIA #/AREA URNS AUTO: ABNORMAL /HPF
BASOPHILS # BLD AUTO: 0.04 X10*3/UL (ref 0–0.1)
BASOPHILS NFR BLD AUTO: 0.6 %
BILIRUB SERPL-MCNC: 0.6 MG/DL (ref 0–1.2)
BILIRUB UR STRIP.AUTO-MCNC: NEGATIVE MG/DL
BUN SERPL-MCNC: 28 MG/DL (ref 6–23)
CALCIUM SERPL-MCNC: 9.1 MG/DL (ref 8.6–10.3)
CAOX CRY #/AREA UR COMP ASSIST: ABNORMAL /HPF
CARDIAC TROPONIN I PNL SERPL HS: 11 NG/L (ref 0–20)
CHLORIDE SERPL-SCNC: 104 MMOL/L (ref 98–107)
CO2 SERPL-SCNC: 27 MMOL/L (ref 21–32)
COLOR UR: YELLOW
CREAT SERPL-MCNC: 1.2 MG/DL (ref 0.5–1.3)
EGFRCR SERPLBLD CKD-EPI 2021: 61 ML/MIN/1.73M*2
EOSINOPHIL # BLD AUTO: 0.15 X10*3/UL (ref 0–0.4)
EOSINOPHIL NFR BLD AUTO: 2.3 %
ERYTHROCYTE [DISTWIDTH] IN BLOOD BY AUTOMATED COUNT: 14.4 % (ref 11.5–14.5)
GLUCOSE SERPL-MCNC: 105 MG/DL (ref 74–99)
GLUCOSE UR STRIP.AUTO-MCNC: NORMAL MG/DL
HCT VFR BLD AUTO: 42.7 % (ref 41–52)
HGB BLD-MCNC: 13.7 G/DL (ref 13.5–17.5)
HOLD SPECIMEN: NORMAL
HOLD SPECIMEN: NORMAL
IMM GRANULOCYTES # BLD AUTO: 0.02 X10*3/UL (ref 0–0.5)
IMM GRANULOCYTES NFR BLD AUTO: 0.3 % (ref 0–0.9)
KETONES UR STRIP.AUTO-MCNC: NEGATIVE MG/DL
LEUKOCYTE ESTERASE UR QL STRIP.AUTO: NEGATIVE
LIPASE SERPL-CCNC: 35 U/L (ref 9–82)
LYMPHOCYTES # BLD AUTO: 1.62 X10*3/UL (ref 0.8–3)
LYMPHOCYTES NFR BLD AUTO: 25.2 %
MAGNESIUM SERPL-MCNC: 1.83 MG/DL (ref 1.6–2.4)
MCH RBC QN AUTO: 29 PG (ref 26–34)
MCHC RBC AUTO-ENTMCNC: 32.1 G/DL (ref 32–36)
MCV RBC AUTO: 90 FL (ref 80–100)
MONOCYTES # BLD AUTO: 0.67 X10*3/UL (ref 0.05–0.8)
MONOCYTES NFR BLD AUTO: 10.4 %
MUCOUS THREADS #/AREA URNS AUTO: ABNORMAL /LPF
NEUTROPHILS # BLD AUTO: 3.94 X10*3/UL (ref 1.6–5.5)
NEUTROPHILS NFR BLD AUTO: 61.2 %
NITRITE UR QL STRIP.AUTO: NEGATIVE
NRBC BLD-RTO: 0 /100 WBCS (ref 0–0)
PH UR STRIP.AUTO: 7 [PH]
PLATELET # BLD AUTO: 189 X10*3/UL (ref 150–450)
POTASSIUM SERPL-SCNC: 3.9 MMOL/L (ref 3.5–5.3)
PROT SERPL-MCNC: 6.6 G/DL (ref 6.4–8.2)
PROT UR STRIP.AUTO-MCNC: ABNORMAL MG/DL
RBC # BLD AUTO: 4.73 X10*6/UL (ref 4.5–5.9)
RBC # UR STRIP.AUTO: NEGATIVE MG/DL
RBC #/AREA URNS AUTO: ABNORMAL /HPF
SODIUM SERPL-SCNC: 137 MMOL/L (ref 136–145)
SP GR UR STRIP.AUTO: >1.05
UROBILINOGEN UR STRIP.AUTO-MCNC: NORMAL MG/DL
WBC # BLD AUTO: 6.4 X10*3/UL (ref 4.4–11.3)
WBC #/AREA URNS AUTO: ABNORMAL /HPF

## 2025-03-30 PROCEDURE — 80053 COMPREHEN METABOLIC PANEL: CPT | Performed by: EMERGENCY MEDICINE

## 2025-03-30 PROCEDURE — 70450 CT HEAD/BRAIN W/O DYE: CPT | Performed by: RADIOLOGY

## 2025-03-30 PROCEDURE — 70450 CT HEAD/BRAIN W/O DYE: CPT

## 2025-03-30 PROCEDURE — 99285 EMERGENCY DEPT VISIT HI MDM: CPT | Mod: 25 | Performed by: EMERGENCY MEDICINE

## 2025-03-30 PROCEDURE — 84484 ASSAY OF TROPONIN QUANT: CPT | Performed by: EMERGENCY MEDICINE

## 2025-03-30 PROCEDURE — 81001 URINALYSIS AUTO W/SCOPE: CPT | Performed by: EMERGENCY MEDICINE

## 2025-03-30 PROCEDURE — 83690 ASSAY OF LIPASE: CPT | Performed by: EMERGENCY MEDICINE

## 2025-03-30 PROCEDURE — 93005 ELECTROCARDIOGRAM TRACING: CPT

## 2025-03-30 PROCEDURE — 74177 CT ABD & PELVIS W/CONTRAST: CPT

## 2025-03-30 PROCEDURE — 85025 COMPLETE CBC W/AUTO DIFF WBC: CPT | Performed by: EMERGENCY MEDICINE

## 2025-03-30 PROCEDURE — 71045 X-RAY EXAM CHEST 1 VIEW: CPT

## 2025-03-30 PROCEDURE — 83735 ASSAY OF MAGNESIUM: CPT | Performed by: EMERGENCY MEDICINE

## 2025-03-30 PROCEDURE — 2550000001 HC RX 255 CONTRASTS: Performed by: EMERGENCY MEDICINE

## 2025-03-30 PROCEDURE — 36415 COLL VENOUS BLD VENIPUNCTURE: CPT | Performed by: EMERGENCY MEDICINE

## 2025-03-30 PROCEDURE — 71045 X-RAY EXAM CHEST 1 VIEW: CPT | Performed by: STUDENT IN AN ORGANIZED HEALTH CARE EDUCATION/TRAINING PROGRAM

## 2025-03-30 RX ORDER — ONDANSETRON HYDROCHLORIDE 2 MG/ML
4 INJECTION, SOLUTION INTRAVENOUS ONCE
Status: DISCONTINUED | OUTPATIENT
Start: 2025-03-30 | End: 2025-03-30 | Stop reason: HOSPADM

## 2025-03-30 RX ADMIN — IOHEXOL 68 ML: 350 INJECTION, SOLUTION INTRAVENOUS at 09:32

## 2025-03-30 ASSESSMENT — PAIN SCALES - GENERAL: PAINLEVEL_OUTOF10: 8

## 2025-03-30 ASSESSMENT — COLUMBIA-SUICIDE SEVERITY RATING SCALE - C-SSRS
2. HAVE YOU ACTUALLY HAD ANY THOUGHTS OF KILLING YOURSELF?: NO
6. HAVE YOU EVER DONE ANYTHING, STARTED TO DO ANYTHING, OR PREPARED TO DO ANYTHING TO END YOUR LIFE?: NO
1. IN THE PAST MONTH, HAVE YOU WISHED YOU WERE DEAD OR WISHED YOU COULD GO TO SLEEP AND NOT WAKE UP?: NO

## 2025-03-30 ASSESSMENT — PAIN DESCRIPTION - PAIN TYPE: TYPE: ACUTE PAIN

## 2025-03-30 ASSESSMENT — PAIN - FUNCTIONAL ASSESSMENT: PAIN_FUNCTIONAL_ASSESSMENT: 0-10

## 2025-03-30 ASSESSMENT — PAIN DESCRIPTION - ORIENTATION: ORIENTATION: RIGHT

## 2025-03-30 ASSESSMENT — PAIN DESCRIPTION - LOCATION: LOCATION: SHOULDER

## 2025-03-30 NOTE — ED PROVIDER NOTES
HPI   Chief Complaint   Patient presents with    Weakness, Gen     Patient complains of generalized weakness, some vomiting, decreased appetite, dizziness over the last month. Wife states he has had 2 falls.       Limitations to History: None    HPI: 79-year-old male presents with multiple complaints.  Somewhere between 3 to 4 weeks ago patient had decrease in appetite.  Intermittent episodes of vomiting after eating.  Patient is sustained frequent falls.  Patient also having right shoulder pain which is chronic in nature.  Was seen at [facility] 2 days ago.  Was in the waiting room for approximately 5 hours did have blood work but ended up leaving prior to being seen.  Denies any chest pain, shortness of breath, abdominal pain, urinary symptoms.    Additional History Obtained from: Wife at the bedside.    ------------------------------------------------------------------------------------------------------------------------------------------  Physical Exam:    VS: As documented in the triage note and EMR flowsheet from this visit were reviewed.    Appearance: Alert. cooperative,  in no acute distress.   Skin: Intact,  dry skin, no lesions, rash, petechiae or purpura.   Eyes: PERRLA, EOMs intact,  Conjunctiva pink with no redness or exudates.   HENT: Normocephalic, atraumatic. Nares patent. No intraoral lesions.   Neck: Supple, without meningismus. Trachea at midline. No lymphadenopathy.  Pulmonary: Clear bilaterally with good chest wall excursion. No rales, rhonchi or wheezing. No accessory muscle use or stridor.  Cardiac: Regular rate and rhythm, no rubs, murmurs, or gallops.   Abdomen: Abdomen is soft, nontender, and nondistended.  No palpable organomegaly.  No rebound or guarding.  No CVA tenderness. Nonsurgical abdomen.  Genitourinary: Exam deferred.  Musculoskeletal: Full range of motion.  Pulses full and equal. No cyanosis, clubbing, or edema.  Neurological:  Cranial nerves are grossly intact,  grossly normal sensation, no weakness, no focal findings identified.  Psychiatric: Appropriate mood and affect.                Patient History   Past Medical History:   Diagnosis Date    Acute myocardial infarction, unspecified     AMI (acute myocardial infarction)    Calculus of kidney 2022    Left renal stone    Personal history of other diseases of male genital organs     History of erectile dysfunction    Personal history of other diseases of urinary system     History of hydronephrosis    Personal history of other specified conditions     History of nocturia    Personal history of urinary calculi     History of kidney stones     Past Surgical History:   Procedure Laterality Date    COLONOSCOPY  2015    REPEAT 10 YRS/ DR SCHWARZ    INJECTION Bilateral 2024    Bilat L4/5-L5 /S1  MBB    OTHER SURGICAL HISTORY  2019    Abdominal aortic aneurysm repair    OTHER SURGICAL HISTORY  2019    Cardiac catheterization with stent placement    OTHER SURGICAL HISTORY  2022    Diagnostic nerve block    OTHER SURGICAL HISTORY  2021    Epidural steroid injection    OTHER SURGICAL HISTORY  2022    Intra-articular injection    SHOULDER ARTHROSCOPY Right 2023     Family History   Problem Relation Name Age of Onset    Heart attack Mother              Other (CVA) Mother      Hyperlipidemia Mother      Hypertension Mother      Other (CABG) Mother      Heart attack Father              Hyperlipidemia Father      Hypertension Father      Depression Sister       Social History     Tobacco Use    Smoking status: Every Day     Current packs/day: 0.50     Types: Cigarettes     Passive exposure: Past    Smokeless tobacco: Never   Vaping Use    Vaping status: Never Used   Substance Use Topics    Alcohol use: Not Currently    Drug use: Not Currently     Comment: he did a long time ago       Physical Exam   ED Triage Vitals [25 0814]   Temperature Heart Rate Respirations  BP   36.8 °C (98.2 °F) 60 17 111/73      Pulse Ox Temp Source Heart Rate Source Patient Position   96 % Oral Monitor --      BP Location FiO2 (%)     -- --       Physical Exam      ED Course & MDM   Diagnoses as of 03/30/25 1047   Generalized weakness   Chronic right shoulder pain   Loss of appetite                 No data recorded     Johan Coma Scale Score: 15 (03/30/25 0818 : Mariama Hardin, RN)                           Medical Decision Making  Labs Reviewed  COMPREHENSIVE METABOLIC PANEL - Abnormal     Glucose                       105 (*)                Sodium                        137                    Potassium                     3.9                    Chloride                      104                    Bicarbonate                   27                     Anion Gap                     10                     Urea Nitrogen                 28 (*)                 Creatinine                    1.20                   eGFR                          61                     Calcium                       9.1                    Albumin                       3.3 (*)                Alkaline Phosphatase          87                     Total Protein                 6.6                    AST                           30                     Bilirubin, Total              0.6                    ALT                           42                  URINALYSIS WITH REFLEX CULTURE AND MICROSCOPIC - Abnormal     Color, Urine                  Yellow                 Appearance, Urine             Clear                  Specific Gravity, Urine       >1.050 (*)               pH, Urine                     7.0                    Protein, Urine                30 (1+) (*)               Glucose, Urine                Normal                 Blood, Urine                  NEGATIVE                Ketones, Urine                NEGATIVE                Bilirubin, Urine              NEGATIVE                Urobilinogen, Urine           Normal                  Nitrite, Urine                NEGATIVE                Leukocyte Esterase, Urine     NEGATIVE             URINALYSIS MICROSCOPIC WITH REFLEX CULTURE - Abnormal     WBC, Urine                    6-10 (*)               RBC, Urine                    11-20 (*)               Bacteria, Urine               2+ (*)                 Mucus, Urine                  1+                     Calcium Oxalate Crystals, Urine   1+                  MAGNESIUM - Normal     Magnesium                     1.83                TROPONIN I, HIGH SENSITIVITY - Normal     Troponin I, High Sensitivity   11                         Narrative: Less than 99th percentile of normal range cutoff-                  Female and children under 18 years old <14 ng/L; Male <21 ng/L: Negative                  Repeat testing should be performed if clinically indicated.                                     Female and children under 18 years old 14-50 ng/L; Male 21-50 ng/L:                  Consistent with possible cardiac damage and possible increased clinical                   risk. Serial measurements may help to assess extent of myocardial damage.                                     >50 ng/L: Consistent with cardiac damage, increased clinical risk and                  myocardial infarction. Serial measurements may help assess extent of                   myocardial damage.                                      NOTE: Children less than 1 year old may have higher baseline troponin                   levels and results should be interpreted in conjunction with the overall                   clinical context.                                     NOTE: Troponin I testing is performed using a different                   testing methodology at [facility] than at other                   [facility]. Direct result comparisons should only                   be made within the same method.  LIPASE - Normal     Lipase                        35                          Narrative: Venipuncture immediately after or during the administration of Metamizole may lead to falsely low results. Testing should be performed immediately prior to Metamizole dosing.  CBC WITH AUTO DIFFERENTIAL     WBC                           6.4                    nRBC                          0.0                    RBC                           4.73                   Hemoglobin                    13.7                   Hematocrit                    42.7                   MCV                           90                     MCH                           29.0                   MCHC                          32.1                   RDW                           14.4                   Platelets                     189                    Neutrophils %                 61.2                   Immature Granulocytes %, Automated   0.3                    Lymphocytes %                 25.2                   Monocytes %                   10.4                   Eosinophils %                 2.3                    Basophils %                   0.6                    Neutrophils Absolute          3.94                   Immature Granulocytes Absolute, Au*   0.02                   Lymphocytes Absolute          1.62                   Monocytes Absolute            0.67                   Eosinophils Absolute          0.15                   Basophils Absolute            0.04                URINALYSIS WITH REFLEX CULTURE AND MICROSCOPIC         Narrative: The following orders were created for panel order Urinalysis with Reflex Culture and Microscopic.                  Procedure                               Abnormality         Status                                     ---------                               -----------         ------                                     Urinalysis with Reflex C...[[id number]]  Abnormal            Final result                               Extra Urine Gray Tube[[id number]]                             In process                                                   Please view results for these tests on the individual orders.  EXTRA URINE GRAY TUBE  CT abdomen pelvis w IV contrast   Final Result    1. No evidence of bowel obstruction, free intraperitoneal air or    abnormal intra-abdominal fluid collection.    2. Colonic diverticulosis, without evidence of acute diverticulitis.          MACRO:    None          Signed by: Feliciano Mckeon 3/30/2025 10:11 AM    Dictation workstation:   [id number]     CT head wo IV contrast   Final Result    1. Diffuse volume loss and periventricular white matter changes, most    consistent with small vessel ischemic disease.    2. No evidence of acute cortical infarct or intracranial hemorrhage.                MACRO:    None          Signed by: Feliciano Mckeon 3/30/2025 10:05 AM    Dictation workstation:   [id number]     XR chest 1 view   Final Result    1. Trace left base atelectasis. No major infiltrate or pleural    effusion.          Signed by: Rubin Orourke 3/30/2025 9:09 AM    Dictation workstation:   [id number]     Medical Decision Making:    Patient appears well and nontoxic.  Vital signs within normal limits.  X-ray reviewed from 2 days ago at outside hospital which shows no acute fracture or dislocation.  Lab work otherwise unremarkable.  CT of the abdomen pelvis shows no acute process.  CT brain negative.  Chest x-ray clear.  After discussion with patient to begin supplementing with boost to help his overall nutrition.  Does have appoint with orthopedic surgeon in 5 days.  Advised on keeping this.  Also given GI follow-up if needed.  Stable at time of discharge.    Differential Diagnoses Considered: Electrolyte abnormality, volume depletion, UTI, chronic shoulder pain    Independent Interpretation of Studies:  I independently interpreted: CT brain shows no intracranial hemorrhage.  CT of the abdomen pelvis without intra-abdominal free air.  Chest x-ray without pneumonia  or pneumothorax.    Escalation of Care:  Appropriate for discharge and follow-up with primary care, orthopedic surgery, gastroenterology.          Procedure  ECG 12 lead    Performed by: Aagpito Ly DO  Authorized by: Agapito Ly DO    ECG interpreted by ED Physician in the absence of a cardiologist: yes    Comments:      EKG interpreted by Dr. Agapito Ly: Sinus bradycardia at 59 bpm.  WY interval 222 ms with first-degree AV block.  QTc of 421 ms.  Nonspecific ST changes.       Agapito Ly DO  03/30/25 1053

## 2025-04-02 LAB
ATRIAL RATE: 59 BPM
P AXIS: 67 DEGREES
P OFFSET: 172 MS
P ONSET: 118 MS
PR INTERVAL: 222 MS
Q ONSET: 229 MS
QRS COUNT: 10 BEATS
QRS DURATION: 64 MS
QT INTERVAL: 426 MS
QTC CALCULATION(BAZETT): 421 MS
QTC FREDERICIA: 423 MS
R AXIS: 54 DEGREES
T AXIS: 56 DEGREES
T OFFSET: 442 MS
VENTRICULAR RATE: 59 BPM

## 2025-04-03 ENCOUNTER — APPOINTMENT (OUTPATIENT)
Dept: SURGERY | Facility: CLINIC | Age: 80
End: 2025-04-03
Payer: MEDICARE

## 2025-04-03 VITALS
WEIGHT: 164.2 LBS | SYSTOLIC BLOOD PRESSURE: 110 MMHG | BODY MASS INDEX: 22.24 KG/M2 | HEIGHT: 72 IN | HEART RATE: 60 BPM | DIASTOLIC BLOOD PRESSURE: 66 MMHG

## 2025-04-03 DIAGNOSIS — K64.5 THROMBOSED HEMORRHOIDS: Primary | ICD-10-CM

## 2025-04-03 PROCEDURE — 3078F DIAST BP <80 MM HG: CPT | Performed by: SURGERY

## 2025-04-03 PROCEDURE — 99203 OFFICE O/P NEW LOW 30 MIN: CPT | Performed by: SURGERY

## 2025-04-03 PROCEDURE — 1159F MED LIST DOCD IN RCRD: CPT | Performed by: SURGERY

## 2025-04-03 PROCEDURE — 3074F SYST BP LT 130 MM HG: CPT | Performed by: SURGERY

## 2025-04-03 NOTE — PROGRESS NOTES
General Surgery Consultation    Patient: Eusebio Marroquin  : 1945  MRN: [id number]  Date of Consultation: 25    Primary Care Provider: Kamari Thayer MD  Referring Provider: Self-referral    Chief Complaint: Perianal tissue    History of Present Illness: Eusebio Marroquin is a 79 y.o. old male seen by self-referral for evaluation of hemorrhoids.  His wife states that he can always remember things well.  He cannot recall how long he has had this.  He denies seeing blood per rectum.  He denies any pain associated with this.  He cannot recall if there was pain when he first noticed this tissue.  He reports that it is always there.  He has bowel movements anywhere from once a day to once every 3 days.  He denies straining with bowel movements, but describes his bowel movements as being small firm pieces.  His wife reports that he spends a long time in the bathroom with bowel movements. He had a colonoscopy in 2015 Dr. Laws.  This showed internal hemorrhoids, but was otherwise normal.  10-year surveillance was recommended.  He has no family history of colon or rectal cancer or inflammatory bowel disease. He takes aspirin and Plavix for CAD with history of cardiac catheterization and stent placement.    Medical History:  CAD  Hypertension  Hypercholesterolemia  History of kidney stones  Hard of hearing  Dementia    Surgical History:  Colonoscopy, 2015 by Dr. Laws, 10-year surveillance recommended  Abdominal aortic aneurysm repair  Cardiac catheterization with stent placement  Epidural steroid injection  Intra-articular injection  Right shoulder arthroscopy    Home Medications:  Prior to Admission medications    Medication Sig Start Date End Date Taking? Authorizing Provider   albuterol 90 mcg/actuation inhaler Inhale 2 puffs every 6 hours if needed for wheezing. 9/3/24 9/3/25  Kamari Thayer MD   alpha tocopherol (Vitamin E) 670 mg (1,000 unit) capsule Take 1 capsule (1,000 Units)  by mouth once daily.    Historical Provider, MD   aspirin-calcium carbonate 81 mg-300 mg calcium(777 mg) tablet Take 1 tablet by mouth once daily.    Historical Provider, MD   atenolol (Tenormin) 25 mg tablet Take 1 tablet (25 mg) by mouth once daily. 10/14/24 10/14/25  Kamari Thayer MD   atorvastatin (Lipitor) 40 mg tablet Take 1 tablet (40 mg) by mouth once daily. 10/14/24 10/14/25  Kamari Thayer MD   budesonide-glycopyr-formoterol (Breztri Aerosphere) 160-9-4.8 mcg/actuation HFA aerosol inhaler Inhale 2 puffs 2 times a day. 3/13/25 3/13/26  Kamari Thayer MD   cholecalciferol (Vitamin D-3) 25 MCG (1000 UT) tablet Take 1 tablet (1,000 Units) by mouth once daily.    Historical Provider, MD   Cinnamon 500 mg capsule Take 1 capsule (500 mg) by mouth once daily.    Historical Provider, MD   clopidogrel (Plavix) 75 mg tablet Take 1 tablet (75 mg) by mouth once daily. 10/14/24 10/14/25  Kamari Thayer MD   ezetimibe (Zetia) 10 mg tablet Take 1 tablet (10 mg) by mouth once daily. 10/14/24 10/14/25  Kamari Thayer MD   gabapentin (Neurontin) 600 mg tablet Take 1 tablet (600 mg) by mouth 3 times a day. 10/14/24 10/14/25  Kamari Thayer MD   glucosamine-chondroitin 500-400 mg tablet Take 1 tablet by mouth 3 times a day.    Historical Provider, MD   hydrocortisone (Anusol-HC) 2.5 % rectal cream Insert into the rectum 4 times a day as needed for hemorrhoids (rectal discomfort). Apply to affected areas 8/26/24 8/26/25  MD PATITO Mills ORAL Take 1 tablet by mouth once daily.    Historical Provider, MD CAREY ORAL Take 1 tablet by mouth once daily. 3/17/09   Historical Provider, MD   sertraline (Zoloft) 50 mg tablet Take one half tab a day for 6 days, then 1 tab daily thereafter 3/3/25   Kamari Thayer MD   tiZANidine (Zanaflex) 4 mg capsule Take 1 capsule (4 mg) by mouth 3 times a day for 7 days. 10/14/24 10/21/24  Kamari Thayer MD   VITAMIN B COMPLEX ORAL Take 1  capsule by mouth once daily.    Historical Provider, MD     Allergies:  Allergies   Allergen Reactions    Oxycodone-Acetaminophen Hallucinations and Unknown    Cefadroxil Rash     Family History:   No family history of colon or rectal cancer or inflammatory bowel disease.  Both parents  from cardiovascular disease.    Social History:  Smokes half a pack of cigarettes daily.  No alcohol use.  No current drug use.    ROS:  Constitutional:  no fever, sweats, and chills, + difficulty hearing  Cardiovascular: + Dizziness  Respiratory: + Shortness of breath/the OPD  Gastrointestinal: + Constipation, excess perianal tissue  Genitourinary: no dysuria  Musculoskeletal: + Shoulder pain, frequent falls  Integumentary: no rashes  Neurological: + Memory loss  Endocrine: no heat or cold intolerance  Heme/Lymph: + Easy bruising or bleeding    Objective:  /66   Pulse 60   Ht 1.829 m (6')   Wt 74.5 kg (164 lb 3.2 oz)   BMI 22.27 kg/m²     Physical Exam:  Constitutional: No acute distress, conversant, pleasant, hard of hearing, looks to his wife to help answer most questions  Neurologic: alert and oriented  Psych: appropriate affect  Ears, Nose, Mouth and Throat: mucus membranes moist  Pulmonary: No labored breathing  Cardiovascular: Regular rate and rhythm  Abdomen: Nondistended, BMI 21  Rectal: He has a very large thrombosed external hemorrhoid in the left lateral location.  No other external perianal abnormalities.  Musculoskeletal: Moves all extremities, no edema  Skin: no jaundice    Procedure: Incision and drainage of thrombosed hemorrhoid  Description: Patient was brought to the procedure room and placed in prone jackknife position with the assistance of my medical assistant, Shine.  The skin overlying the thrombosed external hemorrhoid was anesthetized using injection of 1% lidocaine.  An 11 blade scalpel was used to make 2 small radial incisions overlying this hemorrhoid with expression of a very large  amount of blood clot.  There was very minimal bleeding once all of the blood clot was expressed.  He tolerated the procedure well.    Labs:  Labs from 3/30/25 reviewed: Hgb 13.7, Plts 189, LFT's wnl    Imaging:  CT a/p from 3/30/25 reviewed: diverticulosis.     Assessment and Plan: Eusebio Marroquin is a 79 y.o. old male with a thrombosed external hemorrhoid.  This was incised and drained in the office today.  He tolerated procedure well.  He has chronic constipation and I have recommended daily fiber supplementation for stool softener.  He previously was taking magnesium and this helped to regulate his bowel movements.  I recommended that he resume that.  Of note, I offered colonoscopy but patient was not interested in that at this time.  Given his age, overall health, and the lack of blood per rectum, I think that is reasonable.  I will see him back as needed.    Kimmy Martinez MD  4/3/2025

## 2025-04-10 ENCOUNTER — APPOINTMENT (OUTPATIENT)
Age: 80
End: 2025-04-10
Payer: MEDICARE

## 2025-04-10 VITALS
HEART RATE: 68 BPM | DIASTOLIC BLOOD PRESSURE: 70 MMHG | BODY MASS INDEX: 22.27 KG/M2 | SYSTOLIC BLOOD PRESSURE: 110 MMHG | OXYGEN SATURATION: 94 % | WEIGHT: 164.2 LBS

## 2025-04-10 DIAGNOSIS — E78.00 HYPERCHOLESTEROLEMIA: ICD-10-CM

## 2025-04-10 DIAGNOSIS — I10 HYPERTENSION, ESSENTIAL, BENIGN: ICD-10-CM

## 2025-04-10 DIAGNOSIS — F17.200 CURRENT SMOKER: ICD-10-CM

## 2025-04-10 DIAGNOSIS — M19.011 ARTHROPATHY OF RIGHT SHOULDER: ICD-10-CM

## 2025-04-10 DIAGNOSIS — J43.9 PULMONARY EMPHYSEMA, UNSPECIFIED EMPHYSEMA TYPE (MULTI): Primary | ICD-10-CM

## 2025-04-10 PROCEDURE — 3078F DIAST BP <80 MM HG: CPT | Performed by: FAMILY MEDICINE

## 2025-04-10 PROCEDURE — 1159F MED LIST DOCD IN RCRD: CPT | Performed by: FAMILY MEDICINE

## 2025-04-10 PROCEDURE — G2211 COMPLEX E/M VISIT ADD ON: HCPCS | Performed by: FAMILY MEDICINE

## 2025-04-10 PROCEDURE — 1160F RVW MEDS BY RX/DR IN RCRD: CPT | Performed by: FAMILY MEDICINE

## 2025-04-10 PROCEDURE — 99214 OFFICE O/P EST MOD 30 MIN: CPT | Performed by: FAMILY MEDICINE

## 2025-04-10 PROCEDURE — 3074F SYST BP LT 130 MM HG: CPT | Performed by: FAMILY MEDICINE

## 2025-04-10 NOTE — PROGRESS NOTES
Subjective   Patient ID: Eusebio Marroquin is a 79 y.o. male who presents for Follow-up (Er/r shoulder pain / weakness).    HPI   Seen in ED on3/05 and 03/30 with c/o decreased appetite x3-4 weeks and intermittent vomiting, weakness, with frequent falls. No acute process found on XR chest, CT brain and A/P; ECG SB with 1st degree HB.   Appetite is better now with Boost protein supplementation.     Right shoulder pain still an ongoing issue, seen by ortho ([facility]), with recommendation to start physical therapy, to start today.     Notes markedly reduced activity tolerance and capacity.  Is known to have advanced emphysema.  He is managed on breast tree and albuterol.  Continues to smoke smoking cessation advised.  Patient unwilling to set quit date.  Wife concerned about sleeping and on review he is on a number of sedatives over-the-counter which should be stopped.  Hypertension controlled on atenolol  Hyperlipidemia on statin Mary D diet.  Review of Systems  Decreased exercise tolerance capacity.  Dyspnea with any activity.  Objective   /70   Pulse 68   Wt 74.5 kg (164 lb 3.2 oz)   SpO2 94%   BMI 22.27 kg/m²     Physical Exam  Wheeze rhonchi throughout all lung fields.  Right shoulder appears to be frozen.  Heart regular.  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

## 2025-05-22 ENCOUNTER — APPOINTMENT (OUTPATIENT)
Age: 80
End: 2025-05-22
Payer: MEDICARE

## 2025-05-23 ENCOUNTER — OFFICE VISIT (OUTPATIENT)
Age: 80
End: 2025-05-23
Payer: MEDICARE

## 2025-05-23 VITALS
OXYGEN SATURATION: 90 % | WEIGHT: 165 LBS | DIASTOLIC BLOOD PRESSURE: 80 MMHG | BODY MASS INDEX: 22.38 KG/M2 | SYSTOLIC BLOOD PRESSURE: 140 MMHG | HEART RATE: 98 BPM

## 2025-05-23 DIAGNOSIS — R06.09 DYSPNEA ON EXERTION: Primary | ICD-10-CM

## 2025-05-23 DIAGNOSIS — I10 HYPERTENSION, ESSENTIAL, BENIGN: ICD-10-CM

## 2025-05-23 DIAGNOSIS — I25.10 CORONARY ARTERY DISEASE INVOLVING NATIVE HEART WITHOUT ANGINA PECTORIS, UNSPECIFIED VESSEL OR LESION TYPE: ICD-10-CM

## 2025-05-23 DIAGNOSIS — J43.9 PULMONARY EMPHYSEMA, UNSPECIFIED EMPHYSEMA TYPE (MULTI): ICD-10-CM

## 2025-05-23 PROCEDURE — 1159F MED LIST DOCD IN RCRD: CPT | Performed by: FAMILY MEDICINE

## 2025-05-23 PROCEDURE — 99214 OFFICE O/P EST MOD 30 MIN: CPT | Performed by: FAMILY MEDICINE

## 2025-05-23 PROCEDURE — 3077F SYST BP >= 140 MM HG: CPT | Performed by: FAMILY MEDICINE

## 2025-05-23 PROCEDURE — 3079F DIAST BP 80-89 MM HG: CPT | Performed by: FAMILY MEDICINE

## 2025-05-23 PROCEDURE — 1160F RVW MEDS BY RX/DR IN RCRD: CPT | Performed by: FAMILY MEDICINE

## 2025-05-23 NOTE — PROGRESS NOTES
Subjective   Patient ID: Eusebio Marroquin is a 79 y.o. male who presents for Follow-up (1 mo).    HPI   Mmse 29/30. Some repeating.  He could only recall 2 of 3 items  ER visits rev with lara cerna  .  Is a little irritable with his wife.  He is maintained on Zoloft     he generally does not feel well.  Is discouraged that he is not able to do things he was once able to do.  He continues to smoke and CAT scan shows severe COPD.  No PFTs on file and they do not see the reason to measure as they know their diseases present.  He is on he is on Breztri albuterol    Known to have coronary artery disease with previous stents no imaging recent memory as such we will get a stress test to see if he has an ischemic burden.    Hyperlipidemia- on statin and Zetia and prudent diet  Labs over the last 60 days from multiple ER visits were reviewed and show that he did have an elevated troponin and LFTs which normalized 2 days later  Review of Systems  No acute distress.  A little agitated on questioning.  Has dyspnea with activities of daily living.  Feels fatigued all the time  Objective   /80   Pulse 98   Wt 74.8 kg (165 lb)   SpO2 90%   BMI 22.38 kg/m²     Physical Exam  No bruit thyroid nontender heart regular lungs very distant breath sounds.  Abdomen soft no edema  Assessment/Plan   Problem List Items Addressed This Visit           ICD-10-CM    CAD (coronary artery disease) I25.10    Hypertension, essential, benign I10    Pulmonary emphysema, unspecified emphysema type (Multi) J43.9     Other Visit Diagnoses         Codes      Dyspnea on exertion    -  Primary R06.09    Relevant Orders    Nuclear Stress Test          The identified problems have been treated the best of our ability we will evaluate for ischemic burden.  Smoking cessation advised as always

## 2025-06-03 ENCOUNTER — HOSPITAL ENCOUNTER (OUTPATIENT)
Dept: RADIOLOGY | Facility: HOSPITAL | Age: 80
Discharge: HOME | End: 2025-06-03
Payer: MEDICARE

## 2025-06-03 ENCOUNTER — HOSPITAL ENCOUNTER (OUTPATIENT)
Dept: CARDIOLOGY | Facility: HOSPITAL | Age: 80
Discharge: HOME | End: 2025-06-03
Payer: MEDICARE

## 2025-06-03 ENCOUNTER — HOSPITAL ENCOUNTER (OUTPATIENT)
Dept: RADIOLOGY | Facility: HOSPITAL | Age: 80
End: 2025-06-03
Payer: MEDICARE

## 2025-06-03 VITALS — BODY MASS INDEX: 22.21 KG/M2 | HEIGHT: 72 IN | WEIGHT: 164 LBS

## 2025-06-03 DIAGNOSIS — R06.09 DYSPNEA ON EXERTION: ICD-10-CM

## 2025-06-03 RX ORDER — REGADENOSON 0.08 MG/ML
0.4 INJECTION, SOLUTION INTRAVENOUS
Status: CANCELLED | OUTPATIENT
Start: 2025-06-03

## 2025-06-03 RX ORDER — AMINOPHYLLINE 25 MG/ML
125 INJECTION, SOLUTION INTRAVENOUS AS NEEDED
OUTPATIENT
Start: 2025-06-03

## 2025-06-05 ENCOUNTER — HOSPITAL ENCOUNTER (OUTPATIENT)
Dept: RADIOLOGY | Facility: HOSPITAL | Age: 80
Discharge: HOME | End: 2025-06-05
Payer: MEDICARE

## 2025-06-05 ENCOUNTER — HOSPITAL ENCOUNTER (OUTPATIENT)
Dept: CARDIOLOGY | Facility: HOSPITAL | Age: 80
Discharge: HOME | End: 2025-06-05
Payer: MEDICARE

## 2025-06-05 VITALS
SYSTOLIC BLOOD PRESSURE: 144 MMHG | BODY MASS INDEX: 22.21 KG/M2 | HEIGHT: 72 IN | DIASTOLIC BLOOD PRESSURE: 77 MMHG | HEART RATE: 64 BPM | WEIGHT: 164 LBS

## 2025-06-05 PROCEDURE — 2500000004 HC RX 250 GENERAL PHARMACY W/ HCPCS (ALT 636 FOR OP/ED): Performed by: FAMILY MEDICINE

## 2025-06-05 PROCEDURE — A9502 TC99M TETROFOSMIN: HCPCS | Performed by: FAMILY MEDICINE

## 2025-06-05 PROCEDURE — 93017 CV STRESS TEST TRACING ONLY: CPT

## 2025-06-05 PROCEDURE — 3430000001 HC RX 343 DIAGNOSTIC RADIOPHARMACEUTICALS: Performed by: FAMILY MEDICINE

## 2025-06-05 PROCEDURE — 93018 CV STRESS TEST I&R ONLY: CPT | Performed by: STUDENT IN AN ORGANIZED HEALTH CARE EDUCATION/TRAINING PROGRAM

## 2025-06-05 PROCEDURE — 78452 HT MUSCLE IMAGE SPECT MULT: CPT

## 2025-06-05 PROCEDURE — 93016 CV STRESS TEST SUPVJ ONLY: CPT | Performed by: STUDENT IN AN ORGANIZED HEALTH CARE EDUCATION/TRAINING PROGRAM

## 2025-06-05 RX ORDER — REGADENOSON 0.08 MG/ML
0.4 INJECTION, SOLUTION INTRAVENOUS
Status: COMPLETED | OUTPATIENT
Start: 2025-06-05 | End: 2025-06-05

## 2025-06-05 RX ADMIN — REGADENOSON 0.4 MG: 0.08 INJECTION, SOLUTION INTRAVENOUS at 09:50

## 2025-06-05 RX ADMIN — TETROFOSMIN 30 MILLICURIE: 0.23 INJECTION, POWDER, LYOPHILIZED, FOR SOLUTION INTRAVENOUS at 09:54

## 2025-06-05 RX ADMIN — TETROFOSMIN 10.8 MILLICURIE: 0.23 INJECTION, POWDER, LYOPHILIZED, FOR SOLUTION INTRAVENOUS at 08:35

## 2025-06-09 ENCOUNTER — TELEPHONE (OUTPATIENT)
Age: 80
End: 2025-06-09
Payer: MEDICARE

## 2025-06-09 NOTE — TELEPHONE ENCOUNTER
----- Message from Kamari Thayer sent at 6/5/2025  6:09 PM EDT -----  Let patient and wife know that his stress test is negative  ----- Message -----  From: Interface, [employer] - Cardiology Results In  Sent: 6/5/2025   5:29 PM EDT  To: Kamari Thayer MD    Pt's wife notified.

## 2025-07-01 ENCOUNTER — HOSPITAL ENCOUNTER (OUTPATIENT)
Dept: RADIOLOGY | Facility: HOSPITAL | Age: 80
Discharge: HOME | End: 2025-07-01
Payer: MEDICARE

## 2025-07-01 ENCOUNTER — APPOINTMENT (OUTPATIENT)
Age: 80
End: 2025-07-01
Payer: MEDICARE

## 2025-07-01 VITALS
SYSTOLIC BLOOD PRESSURE: 120 MMHG | WEIGHT: 168 LBS | HEART RATE: 57 BPM | OXYGEN SATURATION: 92 % | DIASTOLIC BLOOD PRESSURE: 66 MMHG | BODY MASS INDEX: 22.78 KG/M2

## 2025-07-01 DIAGNOSIS — M25.512 ACUTE PAIN OF LEFT SHOULDER: Primary | ICD-10-CM

## 2025-07-01 DIAGNOSIS — M25.512 ACUTE PAIN OF LEFT SHOULDER: ICD-10-CM

## 2025-07-01 PROCEDURE — 1159F MED LIST DOCD IN RCRD: CPT | Performed by: FAMILY MEDICINE

## 2025-07-01 PROCEDURE — 1160F RVW MEDS BY RX/DR IN RCRD: CPT | Performed by: FAMILY MEDICINE

## 2025-07-01 PROCEDURE — 73030 X-RAY EXAM OF SHOULDER: CPT | Mod: LEFT SIDE | Performed by: RADIOLOGY

## 2025-07-01 PROCEDURE — 99213 OFFICE O/P EST LOW 20 MIN: CPT | Performed by: FAMILY MEDICINE

## 2025-07-01 PROCEDURE — 73030 X-RAY EXAM OF SHOULDER: CPT | Mod: LT

## 2025-07-01 PROCEDURE — 3074F SYST BP LT 130 MM HG: CPT | Performed by: FAMILY MEDICINE

## 2025-07-01 PROCEDURE — 3078F DIAST BP <80 MM HG: CPT | Performed by: FAMILY MEDICINE

## 2025-07-01 NOTE — PROGRESS NOTES
Subjective   Patient ID: Eusebio Marroquin is a 80 y.o. male who presents for Shoulder Pain (Left, denies injury.).    HPI   Left shoulder for 2 months clicking.  No pain.  No loss of function.  With swinging in their home there is a large audible click  Review of Systems  As above  Objective   /66   Pulse 57   Wt 76.2 kg (168 lb)   SpO2 92%   BMI 22.78 kg/m²     Physical Exam  Nontender AC bicipital tendon or glenohumeral anteriorly nontender over subacromial area.  Abduction to 9070 degrees external rotation 70 degrees internal rotation.  Palpable audible click with swinging arm in the posterior glenoid area  Assessment/Plan   Problem List Items Addressed This Visit           ICD-10-CM    Shoulder pain - Primary M25.519    Relevant Orders    XR shoulder left 2+ views   Given AAOS shoulder exercises to do Thera-Band exercise.  On page 5 and 6

## 2025-07-03 ENCOUNTER — TELEPHONE (OUTPATIENT)
Age: 80
End: 2025-07-03
Payer: MEDICARE

## 2025-07-03 NOTE — TELEPHONE ENCOUNTER
----- Message from Kamari Thayer sent at 7/1/2025  4:37 PM EDT -----  Severe arthritis in the shoulder.  Fortunately he is not having any pain no consultation is needed  ----- Message -----  From: Sung, Radiology Results In  Sent: 7/1/2025   3:35 PM EDT  To: Kamari Thayer MD    Pt's wife notified. Will if he starts having pain.

## 2025-07-24 ENCOUNTER — APPOINTMENT (OUTPATIENT)
Age: 80
End: 2025-07-24
Payer: MEDICARE

## 2025-07-24 VITALS
DIASTOLIC BLOOD PRESSURE: 60 MMHG | SYSTOLIC BLOOD PRESSURE: 120 MMHG | BODY MASS INDEX: 22.38 KG/M2 | OXYGEN SATURATION: 89 % | HEART RATE: 66 BPM | WEIGHT: 165 LBS

## 2025-07-24 DIAGNOSIS — I25.10 CORONARY ARTERY DISEASE INVOLVING NATIVE HEART WITHOUT ANGINA PECTORIS, UNSPECIFIED VESSEL OR LESION TYPE: ICD-10-CM

## 2025-07-24 DIAGNOSIS — Z00.00 ROUTINE GENERAL MEDICAL EXAMINATION AT HEALTH CARE FACILITY: Primary | ICD-10-CM

## 2025-07-24 DIAGNOSIS — E78.00 HYPERCHOLESTEROLEMIA: ICD-10-CM

## 2025-07-24 DIAGNOSIS — F17.200 CURRENT SMOKER: ICD-10-CM

## 2025-07-24 DIAGNOSIS — M19.011 ARTHROPATHY OF RIGHT SHOULDER: ICD-10-CM

## 2025-07-24 DIAGNOSIS — I10 HYPERTENSION, ESSENTIAL, BENIGN: ICD-10-CM

## 2025-07-24 DIAGNOSIS — M19.012 ARTHROPATHY OF LEFT SHOULDER: ICD-10-CM

## 2025-07-24 PROCEDURE — 99213 OFFICE O/P EST LOW 20 MIN: CPT | Performed by: FAMILY MEDICINE

## 2025-07-24 PROCEDURE — 3074F SYST BP LT 130 MM HG: CPT | Performed by: FAMILY MEDICINE

## 2025-07-24 PROCEDURE — 3078F DIAST BP <80 MM HG: CPT | Performed by: FAMILY MEDICINE

## 2025-07-24 PROCEDURE — 1160F RVW MEDS BY RX/DR IN RCRD: CPT | Performed by: FAMILY MEDICINE

## 2025-07-24 PROCEDURE — 1124F ACP DISCUSS-NO DSCNMKR DOCD: CPT | Performed by: FAMILY MEDICINE

## 2025-07-24 PROCEDURE — G0439 PPPS, SUBSEQ VISIT: HCPCS | Performed by: FAMILY MEDICINE

## 2025-07-24 PROCEDURE — 1159F MED LIST DOCD IN RCRD: CPT | Performed by: FAMILY MEDICINE

## 2025-07-24 PROCEDURE — 1170F FXNL STATUS ASSESSED: CPT | Performed by: FAMILY MEDICINE

## 2025-07-24 ASSESSMENT — ACTIVITIES OF DAILY LIVING (ADL)
GROCERY_SHOPPING: INDEPENDENT
DRESSING: INDEPENDENT
MANAGING_FINANCES: INDEPENDENT
BATHING: INDEPENDENT
TAKING_MEDICATION: INDEPENDENT
DOING_HOUSEWORK: INDEPENDENT

## 2025-07-24 ASSESSMENT — ENCOUNTER SYMPTOMS
DEPRESSION: 0
LOSS OF SENSATION IN FEET: 0
OCCASIONAL FEELINGS OF UNSTEADINESS: 0

## 2025-07-24 NOTE — ASSESSMENT & PLAN NOTE
Orders:    Follow Up In Primary Care    CBC; Future    Comprehensive Metabolic Panel; Future    Lipid Panel; Future    Follow Up In Primary Care; Future

## 2025-07-24 NOTE — PROGRESS NOTES
Subjective   Reason for Visit: Eusebio Marroquin is an 80 y.o. male here for a Medicare Wellness visit.     Past Medical, Surgical, and Family History reviewed and updated in chart.    Reviewed all medications by prescribing practitioner or clinical pharmacist (such as prescriptions, OTCs, herbal therapies and supplements) and documented in the medical record.    HPI  Since the last office visit there have been no interval operations, hospitalizations, important illnesses or injuries.  Smoke cessation advised.  copd- no exacerbations since last ov.  CAD-0  PVD- no claudication  HTN-Takes and tolerates meds without side effects. No alcohol. no tobacco. no exercise. low salt.  Reviewed recommendation for 150 minutes of exercise per week including 2 days of weight training if over age 50  Hyperlipidemia- is on a statin and a prudent diet.  Left shoulder xr reviewed- managed and no intervention accepted    Patient Care Team:  Kamari Thayer MD as PCP - General (Family Medicine)     Review of Systems  Denies chest pains palpitations.  Limited activity tolerance.  Dyspnea with much of any activity  Objective   Vitals:  /60   Pulse 66   Wt 74.8 kg (165 lb)   SpO2 (!) 89%   BMI 22.38 kg/m²       Physical Exam  No adenopathy in the neck no bruit thyroid nontender heart regular lungs clear but distant equal breath sounds.  No edema in extremities  Assessment & Plan  Routine general medical examination at health care facility    Orders:    Follow Up In Primary Care    1 Year Follow Up In Primary Care - Wellness Exam; Future    Follow Up In Primary Care; Future    Current smoker    Orders:    Follow Up In Primary Care    Follow Up In Primary Care; Future    Coronary artery disease involving native heart without angina pectoris, unspecified vessel or lesion type    Orders:    Follow Up In Primary Care    CBC; Future    Comprehensive Metabolic Panel; Future    Lipid Panel; Future    Follow Up In Primary Care;  Future    Hypercholesterolemia    Orders:    Follow Up In Primary Care    CBC; Future    Comprehensive Metabolic Panel; Future    Lipid Panel; Future    Follow Up In Primary Care; Future    Hypertension, essential, benign    Orders:    Follow Up In Primary Care    CBC; Future    Comprehensive Metabolic Panel; Future    Lipid Panel; Future    Follow Up In Primary Care; Future    Arthropathy of left shoulder    Orders:    Follow Up In Primary Care    Follow Up In Primary Care; Future    Arthropathy of right shoulder    Orders:    Follow Up In Primary Care

## 2025-08-04 DIAGNOSIS — I25.10 CORONARY ARTERY DISEASE INVOLVING NATIVE HEART WITHOUT ANGINA PECTORIS, UNSPECIFIED VESSEL OR LESION TYPE: ICD-10-CM

## 2025-08-05 RX ORDER — ATORVASTATIN CALCIUM 40 MG/1
40 TABLET, FILM COATED ORAL DAILY
Qty: 90 TABLET | Refills: 3 | Status: SHIPPED | OUTPATIENT
Start: 2025-08-05

## 2025-08-05 RX ORDER — ATENOLOL 25 MG/1
25 TABLET ORAL DAILY
Qty: 90 TABLET | Refills: 3 | Status: SHIPPED | OUTPATIENT
Start: 2025-08-05

## 2025-08-05 RX ORDER — EZETIMIBE 10 MG/1
10 TABLET ORAL DAILY
Qty: 90 TABLET | Refills: 3 | Status: SHIPPED | OUTPATIENT
Start: 2025-08-05

## 2025-08-05 RX ORDER — CLOPIDOGREL BISULFATE 75 MG/1
75 TABLET ORAL DAILY
Qty: 90 TABLET | Refills: 3 | Status: SHIPPED | OUTPATIENT
Start: 2025-08-05

## 2026-01-20 ENCOUNTER — APPOINTMENT (OUTPATIENT)
Age: 81
End: 2026-01-20
Payer: MEDICARE